# Patient Record
Sex: FEMALE | Race: ASIAN | NOT HISPANIC OR LATINO | Employment: FULL TIME | URBAN - METROPOLITAN AREA
[De-identification: names, ages, dates, MRNs, and addresses within clinical notes are randomized per-mention and may not be internally consistent; named-entity substitution may affect disease eponyms.]

---

## 2022-01-18 ENCOUNTER — TELEPHONE (OUTPATIENT)
Dept: GASTROENTEROLOGY | Facility: CLINIC | Age: 47
End: 2022-01-18

## 2022-01-18 ENCOUNTER — OFFICE VISIT (OUTPATIENT)
Dept: GASTROENTEROLOGY | Facility: CLINIC | Age: 47
End: 2022-01-18

## 2022-01-18 VITALS — BODY MASS INDEX: 19.09 KG/M2 | HEIGHT: 64 IN | WEIGHT: 111.8 LBS | TEMPERATURE: 97.8 F

## 2022-01-18 DIAGNOSIS — Z85.3 HISTORY OF BREAST CANCER: ICD-10-CM

## 2022-01-18 DIAGNOSIS — K58.0 IRRITABLE BOWEL SYNDROME WITH DIARRHEA: Primary | ICD-10-CM

## 2022-01-18 PROCEDURE — 99203 OFFICE O/P NEW LOW 30 MIN: CPT | Performed by: INTERNAL MEDICINE

## 2022-01-18 RX ORDER — ALOSETRON HYDROCHLORIDE 0.5 MG/1
0.5 TABLET, FILM COATED ORAL 2 TIMES DAILY
COMMUNITY
Start: 2021-09-22 | End: 2022-01-18 | Stop reason: SDUPTHER

## 2022-01-18 RX ORDER — MULTIPLE VITAMINS W/ MINERALS TAB 9MG-400MCG
1 TAB ORAL DAILY
COMMUNITY

## 2022-01-18 RX ORDER — ALOSETRON HYDROCHLORIDE 0.5 MG/1
0.5 TABLET, FILM COATED ORAL 2 TIMES DAILY
Qty: 180 TABLET | Refills: 3 | Status: SHIPPED | OUTPATIENT
Start: 2022-01-18 | End: 2023-03-28

## 2022-01-18 NOTE — PROGRESS NOTES
Chief Complaint   Patient presents with   • Establish Care   • Irritable Bowel Syndrome     Patti Hogue is a 46 y.o. female who presents with a history of IBS  HPI     Patient 46-year-old female with history of breast cancer, gestational diabetes and IBS diarrhea predominant presenting for establishing care.  Patient's last gastroenterologist in [facility] treated to good effect with Lotronex.  Patient reportedly started with Viberzi but unfortunately this did not improve her symptoms and found Lotronex subsequently.  Patient has done very well for the last 10 years on Lotronex but approximately 6 months ago started having weekly breakthrough episodes.  Patient using Imodium as needed which is helping her symptoms but is concerned with the new change.  Patient denies any nausea vomiting no melena or bright red blood per rectum.    Past Medical History:   Diagnosis Date   • Cancer (HCC)     breast cancer 2007 DCIS   • Gestational diabetes    • IBS (irritable bowel syndrome)    • Low blood pressure        Current Outpatient Medications:   •  alosetron (LOTRONEX) 0.5 MG tablet, Take 0.5 mg by mouth 2 (Two) Times a Day., Disp: , Rfl:   •  multivitamin with minerals (MULTIVITAMIN WOMEN PO), Take 1 tablet by mouth Daily., Disp: , Rfl:   Allergies   Allergen Reactions   • Penicillins Hives     Social History     Socioeconomic History   • Marital status:    Tobacco Use   • Smoking status: Passive Smoke Exposure - Never Smoker   • Smokeless tobacco: Never Used   • Tobacco comment: social in college    Substance and Sexual Activity   • Alcohol use: Yes     Comment: a glass of wine per night    • Drug use: Never     Family History   Family history unknown: Yes     Review of Systems   Constitutional: Negative.    HENT: Negative.    Eyes: Negative.    Respiratory: Negative.    Cardiovascular: Negative.    Gastrointestinal: Negative.    Endocrine: Negative.    Musculoskeletal: Negative.    Skin: Negative.     Allergic/Immunologic: Negative.    Hematological: Negative.      Vitals:    01/18/22 1526   Temp: 97.8 °F (36.6 °C)     Physical Exam  Vitals and nursing note reviewed.   Constitutional:       Appearance: Normal appearance. She is well-developed and normal weight.   HENT:      Head: Normocephalic and atraumatic.   Eyes:      General: No scleral icterus.     Pupils: Pupils are equal, round, and reactive to light.   Cardiovascular:      Rate and Rhythm: Normal rate and regular rhythm.      Heart sounds: Normal heart sounds. No murmur heard.  No friction rub. No gallop.    Pulmonary:      Effort: Pulmonary effort is normal.      Breath sounds: Normal breath sounds. No wheezing or rales.   Abdominal:      General: Bowel sounds are normal. There is no distension or abdominal bruit.      Palpations: Abdomen is soft. Abdomen is not rigid. There is no shifting dullness, fluid wave, mass or pulsatile mass.      Tenderness: There is no abdominal tenderness. There is no guarding.      Hernia: No hernia is present.   Musculoskeletal:         General: No swelling or tenderness. Normal range of motion.      Cervical back: Normal range of motion and neck supple. No rigidity.   Skin:     General: Skin is warm and dry.      Coloration: Skin is not jaundiced.      Findings: No rash.   Neurological:      General: No focal deficit present.      Mental Status: She is alert and oriented to person, place, and time.      Cranial Nerves: No cranial nerve deficit.   Psychiatric:         Behavior: Behavior normal.         Thought Content: Thought content normal.       Diagnoses and all orders for this visit:    Irritable bowel syndrome with diarrhea  -     Case Request; Standing  -     Case Request    Other orders  -     alosetron (LOTRONEX) 0.5 MG tablet; Take 0.5 mg by mouth 2 (Two) Times a Day.  -     multivitamin with minerals (MULTIVITAMIN WOMEN PO); Take 1 tablet by mouth Daily.    Patient 46-year-old female with 10-year history of  IBS failed Viberzi in the past has been doing well with Lotronex for the last 10 years.  Patient reportedly moved to [place] in February and did well for some time but over the last 6 months is started to have weekly breakthrough of her diarrhea despite the Lotronex.  Patient denies any bright red blood per rectum or melena has no family history of colorectal cancer or polyps.  At this point, because of the change in the stools and last colonoscopy more than 10 years ago would recommend repeating colonoscopy to evaluate for possible change.  We will continue the Lotronex as it is giving her relief though these weekly episodes require Imodium in addition.  Patient also requesting oncology recommendation as well as GYN because of her history of breast cancer.  Referrals were placed for those as well.

## 2022-01-18 NOTE — TELEPHONE ENCOUNTER
gabrielle Colón for 03/21 arrive at 1200/cs- gave pt procedure packet in offfice on 1/18, advised pt time is subject to change [facility] will call.

## 2022-02-23 NOTE — PROGRESS NOTES
Subjective Patient transferring care upon moved to [facility]    REASON FOR CONSULTATION: History of DCIS left breast 2007  Provide an opinion on any further workup or treatment                             REQUESTING PHYSICIAN:      RECORDS OBTAINED:  Records of the patients history including those obtained from the referring provider were reviewed and summarized in detail.    HISTORY OF PRESENT ILLNESS:  The patient is a 46 y.o. year old female who is here for an opinion about the above issue.    History of Present Illness   The patient is now a 46-year-old female seen in January 2022 by GI medicine presenting to [facility].  She has a known history of gestational diabetes, IBS diarrhea and been treated with Lotronex successfully.  She was seen by Dr. Blanco 1/18/2022 having moved to [place] in February of this year and with a history of IBS initially treated with Viberzi which was unsuccessful and the patient, thereafter, was treated successfully with Lotronex.  Plans were made for subsequent colonoscopy and, additionally, oncology recommendation was requested as result of a previous history of ductal carcinoma in situ.     Her records concerning this included review by Dr. Deepika Tejada of [facility] on 2/12/2020 with a history of DCIS (ER positive) status post lumpectomy and XRT in 2007 followed by tamoxifen x3 years discontinued 2010.  The patient describes genetic assessment for likely BRCA in 2007 which was negative.  The patient's been followed by yearly mammography and possibly every third year MRI.  She has been clinically stable without recurrence undergoing genetic assessment which was evidently unremarkable and also reviewed and treated for iron deficiency anemia.  Patient studies date from 2/12/2020 with ferritin 29.5, B12 906.5, vitamin D of 30.7, CMP with BUN/creatinine of 13.5 and 0.67, calcium 9.6, normal LFTs, iron of 115, TIBC of 3 1736%  saturation, LDH of 142  The patient is initially seen 2/24/2022 at [facility] office and we discussed her history as well as periodic symptoms of left upper extremity tremor, recent ParaGard placement in 2018 for irregular menses, associated iron deficiency and need for additional genetic assessment with extended panel.      Past Medical History:   Diagnosis Date   • Cancer (HCC)     breast cancer 2007 DCIS   • Gestational diabetes    • IBS (irritable bowel syndrome)    • Low blood pressure         Past Surgical History:   Procedure Laterality Date   • BREAST LUMPECTOMY  2007   • COLONOSCOPY  approx 2011    negative per pt    • TUMOR REMOVAL      right foot during childhood        Current Outpatient Medications on File Prior to Visit   Medication Sig Dispense Refill   • alosetron (LOTRONEX) 0.5 MG tablet Take 1 tablet by mouth 2 (Two) Times a Day. 180 tablet 3   • multivitamin with minerals (MULTIVITAMIN WOMEN PO) Take 1 tablet by mouth Daily.       No current facility-administered medications on file prior to visit.        ALLERGIES:    Allergies   Allergen Reactions   • Penicillins Hives        Social History     Socioeconomic History   • Marital status:      Spouse name: Max   • Years of education: College   Tobacco Use   • Smoking status: Passive Smoke Exposure - Never Smoker   • Smokeless tobacco: Never Used   • Tobacco comment: social in college    Substance and Sexual Activity   • Alcohol use: Yes     Comment: a glass of wine per night    • Drug use: Never        Family History   Family history unknown: Yes        Review of Systems   Constitutional: Negative.    HENT: Negative.    Eyes: Negative.    Respiratory: Negative.    Cardiovascular: Negative.    Gastrointestinal: Positive for diarrhea (See history of present illness).   Endocrine: Negative.    Genitourinary: Negative.    Musculoskeletal: Negative.    Skin: Negative.    Allergic/Immunologic: Negative.    Neurological: Positive for tremors (Left upper  "extremity, episodic).   Hematological: Negative.    Psychiatric/Behavioral: Negative.        Objective     Vitals:    02/24/22 1024   BP: 134/70   Pulse: 61   Resp: 16   Temp: 97.3 °F (36.3 °C)   TempSrc: Temporal   SpO2: 100%   Weight: 50 kg (110 lb 4.8 oz)   Height: 162.6 cm (64\")   PainSc: 0-No pain     Current Status 2/24/2022   ECOG score 0       Physical Exam  Constitutional:       Appearance: Normal appearance. She is normal weight.   HENT:      Head: Normocephalic and atraumatic.      Nose: Nose normal.      Mouth/Throat:      Mouth: Mucous membranes are moist.      Pharynx: Oropharynx is clear.   Eyes:      Extraocular Movements: Extraocular movements intact.      Conjunctiva/sclera: Conjunctivae normal.      Pupils: Pupils are equal, round, and reactive to light.   Cardiovascular:      Rate and Rhythm: Normal rate and regular rhythm.      Pulses: Normal pulses.      Heart sounds: Normal heart sounds.   Pulmonary:      Effort: Pulmonary effort is normal.      Breath sounds: Normal breath sounds.      Comments: Patient status post left breast lumpectomy, well-healed, radiation tattoos recognized, no additional abnormalities  Abdominal:      General: Bowel sounds are normal.      Palpations: Abdomen is soft.   Musculoskeletal:         General: Normal range of motion.      Cervical back: Normal range of motion and neck supple.   Skin:     General: Skin is warm and dry.   Neurological:      General: No focal deficit present.      Mental Status: She is alert and oriented to person, place, and time.   Psychiatric:         Mood and Affect: Mood normal.         Behavior: Behavior normal.           RECENT LABS:  Hematology WBC   Date Value Ref Range Status   02/24/2022 4.64 3.40 - 10.80 10*3/mm3 Final     RBC   Date Value Ref Range Status   02/24/2022 3.96 3.77 - 5.28 10*6/mm3 Final     Hemoglobin   Date Value Ref Range Status   02/24/2022 12.5 12.0 - 15.9 g/dL Final     Hematocrit   Date Value Ref Range Status "   02/24/2022 36.2 34.0 - 46.6 % Final     Platelets   Date Value Ref Range Status   02/24/2022 188 140 - 450 10*3/mm3 Final          Assessment/Plan     46-year-old female with history of:    *DCIS left breast 2007 status post lumpectomy, radiation therapy and 3 years of tamoxifen.  Patient describes the tumor was ER positive but is unclear about additional testing.  · The patient will require follow-up yearly mammography and ultrasound per report 2/11/2020.  We will contact her and try to update the schedule.  · We have discussed further genetic testing-additional breast panel with referral to genetics planned  · Return to laboratory today for baseline CMP, ferritin iron profile  · Patient requests gynecologic referral  · Follow-up CBC office 8 weeks

## 2022-02-24 ENCOUNTER — CONSULT (OUTPATIENT)
Dept: ONCOLOGY | Facility: CLINIC | Age: 47
End: 2022-02-24

## 2022-02-24 ENCOUNTER — LAB (OUTPATIENT)
Dept: LAB | Facility: HOSPITAL | Age: 47
End: 2022-02-24

## 2022-02-24 VITALS
OXYGEN SATURATION: 100 % | HEART RATE: 61 BPM | HEIGHT: 64 IN | SYSTOLIC BLOOD PRESSURE: 134 MMHG | DIASTOLIC BLOOD PRESSURE: 70 MMHG | RESPIRATION RATE: 16 BRPM | TEMPERATURE: 97.3 F | WEIGHT: 110.3 LBS | BODY MASS INDEX: 18.83 KG/M2

## 2022-02-24 DIAGNOSIS — D05.12 BREAST NEOPLASM, TIS (DCIS), LEFT: Primary | ICD-10-CM

## 2022-02-24 DIAGNOSIS — Z85.3 HISTORY OF BREAST CANCER: Primary | ICD-10-CM

## 2022-02-24 DIAGNOSIS — Z86.000 HISTORY OF DUCTAL CARCINOMA IN SITU (DCIS) OF BREAST: ICD-10-CM

## 2022-02-24 LAB
ALBUMIN SERPL-MCNC: 4.6 G/DL (ref 3.5–5.2)
ALBUMIN/GLOB SERPL: 2 G/DL (ref 1.1–2.4)
ALP SERPL-CCNC: 36 U/L (ref 38–116)
ALT SERPL W P-5'-P-CCNC: 11 U/L (ref 0–33)
ANION GAP SERPL CALCULATED.3IONS-SCNC: 10.2 MMOL/L (ref 5–15)
AST SERPL-CCNC: 14 U/L (ref 0–32)
BASOPHILS # BLD AUTO: 0.02 10*3/MM3 (ref 0–0.2)
BASOPHILS NFR BLD AUTO: 0.4 % (ref 0–1.5)
BILIRUB SERPL-MCNC: 0.5 MG/DL (ref 0.2–1.2)
BUN SERPL-MCNC: 14 MG/DL (ref 6–20)
BUN/CREAT SERPL: 18.9 (ref 7.3–30)
CALCIUM SPEC-SCNC: 8.9 MG/DL (ref 8.5–10.2)
CHLORIDE SERPL-SCNC: 103 MMOL/L (ref 98–107)
CO2 SERPL-SCNC: 27.8 MMOL/L (ref 22–29)
CREAT SERPL-MCNC: 0.74 MG/DL (ref 0.6–1.1)
DEPRECATED RDW RBC AUTO: 44.6 FL (ref 37–54)
EOSINOPHIL # BLD AUTO: 0.06 10*3/MM3 (ref 0–0.4)
EOSINOPHIL NFR BLD AUTO: 1.3 % (ref 0.3–6.2)
ERYTHROCYTE [DISTWIDTH] IN BLOOD BY AUTOMATED COUNT: 13.2 % (ref 12.3–15.4)
FERRITIN SERPL-MCNC: 13.6 NG/ML (ref 11–207)
GFR SERPL CREATININE-BSD FRML MDRD: 103 ML/MIN/1.73
GFR SERPL CREATININE-BSD FRML MDRD: 85 ML/MIN/1.73
GLOBULIN UR ELPH-MCNC: 2.3 GM/DL (ref 1.8–3.5)
GLUCOSE SERPL-MCNC: 111 MG/DL (ref 74–124)
HCT VFR BLD AUTO: 36.2 % (ref 34–46.6)
HGB BLD-MCNC: 12.5 G/DL (ref 12–15.9)
IMM GRANULOCYTES # BLD AUTO: 0.11 10*3/MM3 (ref 0–0.05)
IMM GRANULOCYTES NFR BLD AUTO: 2.4 % (ref 0–0.5)
IRON 24H UR-MRATE: 45 MCG/DL (ref 37–145)
IRON SATN MFR SERPL: 10 % (ref 14–48)
LYMPHOCYTES # BLD AUTO: 1.33 10*3/MM3 (ref 0.7–3.1)
LYMPHOCYTES NFR BLD AUTO: 28.7 % (ref 19.6–45.3)
MCH RBC QN AUTO: 31.6 PG (ref 26.6–33)
MCHC RBC AUTO-ENTMCNC: 34.5 G/DL (ref 31.5–35.7)
MCV RBC AUTO: 91.4 FL (ref 79–97)
MONOCYTES # BLD AUTO: 0.29 10*3/MM3 (ref 0.1–0.9)
MONOCYTES NFR BLD AUTO: 6.3 % (ref 5–12)
NEUTROPHILS NFR BLD AUTO: 2.83 10*3/MM3 (ref 1.7–7)
NEUTROPHILS NFR BLD AUTO: 60.9 % (ref 42.7–76)
NRBC BLD AUTO-RTO: 0 /100 WBC (ref 0–0.2)
PLATELET # BLD AUTO: 188 10*3/MM3 (ref 140–450)
PMV BLD AUTO: 10.7 FL (ref 6–12)
POTASSIUM SERPL-SCNC: 3.8 MMOL/L (ref 3.5–4.7)
PROT SERPL-MCNC: 6.9 G/DL (ref 6.3–8)
RBC # BLD AUTO: 3.96 10*6/MM3 (ref 3.77–5.28)
SODIUM SERPL-SCNC: 141 MMOL/L (ref 134–145)
TIBC SERPL-MCNC: 449 MCG/DL (ref 249–505)
TRANSFERRIN SERPL-MCNC: 321 MG/DL (ref 200–360)
WBC NRBC COR # BLD: 4.64 10*3/MM3 (ref 3.4–10.8)

## 2022-02-24 PROCEDURE — 99204 OFFICE O/P NEW MOD 45 MIN: CPT | Performed by: INTERNAL MEDICINE

## 2022-02-24 PROCEDURE — 83540 ASSAY OF IRON: CPT | Performed by: INTERNAL MEDICINE

## 2022-02-24 PROCEDURE — 84466 ASSAY OF TRANSFERRIN: CPT | Performed by: INTERNAL MEDICINE

## 2022-02-24 PROCEDURE — 82728 ASSAY OF FERRITIN: CPT | Performed by: INTERNAL MEDICINE

## 2022-02-24 PROCEDURE — 85025 COMPLETE CBC W/AUTO DIFF WBC: CPT

## 2022-02-24 PROCEDURE — 80053 COMPREHEN METABOLIC PANEL: CPT | Performed by: INTERNAL MEDICINE

## 2022-02-24 PROCEDURE — 36415 COLL VENOUS BLD VENIPUNCTURE: CPT

## 2022-02-25 ENCOUNTER — TELEPHONE (OUTPATIENT)
Dept: ONCOLOGY | Facility: CLINIC | Age: 47
End: 2022-02-25

## 2022-02-25 NOTE — TELEPHONE ENCOUNTER
Attempted to call pt both yesterday and today. Per Dr. Calderón, he would like for pt to have a screening mammogram done and would also like to inform her that her iron is low and she should start OTC iron daily. Attempted to call pt x 3 and left 2 VMs. Awaiting callback.

## 2022-02-28 ENCOUNTER — TELEPHONE (OUTPATIENT)
Dept: ONCOLOGY | Facility: CLINIC | Age: 47
End: 2022-02-28

## 2022-02-28 ENCOUNTER — TRANSCRIBE ORDERS (OUTPATIENT)
Dept: ADMINISTRATIVE | Facility: HOSPITAL | Age: 47
End: 2022-02-28

## 2022-02-28 DIAGNOSIS — Z86.000 HISTORY OF CARCINOMA IN SITU OF BREAST: Primary | ICD-10-CM

## 2022-02-28 DIAGNOSIS — Z86.000 HISTORY OF DUCTAL CARCINOMA IN SITU (DCIS) OF BREAST: Primary | ICD-10-CM

## 2022-02-28 DIAGNOSIS — Z86.000 PERSONAL HISTORY OF IN-SITU NEOPLASM OF BREAST: ICD-10-CM

## 2022-02-28 RX ORDER — FERROUS SULFATE 325(65) MG
325 TABLET ORAL
Qty: 30 TABLET | Refills: 5 | Status: SHIPPED | OUTPATIENT
Start: 2022-02-28 | End: 2022-09-01

## 2022-02-28 NOTE — TELEPHONE ENCOUNTER
Pt returned call Friday after hours and left voicemail. Returned call and no answer. LVM explaining that Dr. Calderón feels she needs a bilateral screening mammogram and US and needs to start on oral iron as her iron levels are very low. Advised her to return call to let RN know she has received the message. Escribed ferrous sulfate to pharmacy, order placed and message sent to scheduling.

## 2022-03-03 ENCOUNTER — TELEPHONE (OUTPATIENT)
Dept: ONCOLOGY | Facility: CLINIC | Age: 47
End: 2022-03-03

## 2022-03-03 NOTE — TELEPHONE ENCOUNTER
Pt returned call. She wanted to clarify oral iron instructions. Advised her Dr. Calderón wants her to start on ferrous sulfate 325mg daily. Pt states she has IBS and is concerned about taking oral iron. Advised her if she cannot tolerate, she needs to call us and we can talk about switching her to IV iron. She v/u.

## 2022-03-21 ENCOUNTER — ANESTHESIA (OUTPATIENT)
Dept: GASTROENTEROLOGY | Facility: HOSPITAL | Age: 47
End: 2022-03-21

## 2022-03-21 ENCOUNTER — HOSPITAL ENCOUNTER (OUTPATIENT)
Facility: HOSPITAL | Age: 47
Setting detail: HOSPITAL OUTPATIENT SURGERY
Discharge: HOME | End: 2022-03-21
Attending: INTERNAL MEDICINE | Admitting: INTERNAL MEDICINE

## 2022-03-21 ENCOUNTER — ANESTHESIA EVENT (OUTPATIENT)
Dept: GASTROENTEROLOGY | Facility: HOSPITAL | Age: 47
End: 2022-03-21

## 2022-03-21 VITALS
BODY MASS INDEX: 18.44 KG/M2 | HEART RATE: 70 BPM | HEIGHT: 64 IN | DIASTOLIC BLOOD PRESSURE: 68 MMHG | OXYGEN SATURATION: 100 % | WEIGHT: 108 LBS | SYSTOLIC BLOOD PRESSURE: 124 MMHG | RESPIRATION RATE: 16 BRPM

## 2022-03-21 DIAGNOSIS — K58.0 IRRITABLE BOWEL SYNDROME WITH DIARRHEA: ICD-10-CM

## 2022-03-21 LAB
B-HCG UR QL: NEGATIVE
EXPIRATION DATE: NORMAL
INTERNAL NEGATIVE CONTROL: NORMAL
INTERNAL POSITIVE CONTROL: NORMAL
Lab: NORMAL

## 2022-03-21 PROCEDURE — 88305 TISSUE EXAM BY PATHOLOGIST: CPT | Performed by: INTERNAL MEDICINE

## 2022-03-21 PROCEDURE — 45380 COLONOSCOPY AND BIOPSY: CPT | Performed by: INTERNAL MEDICINE

## 2022-03-21 PROCEDURE — 25010000002 PROPOFOL 10 MG/ML EMULSION: Performed by: NURSE ANESTHETIST, CERTIFIED REGISTERED

## 2022-03-21 PROCEDURE — S0260 H&P FOR SURGERY: HCPCS | Performed by: INTERNAL MEDICINE

## 2022-03-21 PROCEDURE — 81025 URINE PREGNANCY TEST: CPT | Performed by: INTERNAL MEDICINE

## 2022-03-21 RX ORDER — PROPOFOL 10 MG/ML
VIAL (ML) INTRAVENOUS CONTINUOUS PRN
Status: DISCONTINUED | OUTPATIENT
Start: 2022-03-21 | End: 2022-03-21 | Stop reason: SURG

## 2022-03-21 RX ORDER — SODIUM CHLORIDE 0.9 % (FLUSH) 0.9 %
10 SYRINGE (ML) INJECTION AS NEEDED
Status: DISCONTINUED | OUTPATIENT
Start: 2022-03-21 | End: 2022-03-21 | Stop reason: HOSPADM

## 2022-03-21 RX ORDER — LIDOCAINE HYDROCHLORIDE 20 MG/ML
INJECTION, SOLUTION INFILTRATION; PERINEURAL AS NEEDED
Status: DISCONTINUED | OUTPATIENT
Start: 2022-03-21 | End: 2022-03-21 | Stop reason: SURG

## 2022-03-21 RX ORDER — SODIUM CHLORIDE, SODIUM LACTATE, POTASSIUM CHLORIDE, CALCIUM CHLORIDE 600; 310; 30; 20 MG/100ML; MG/100ML; MG/100ML; MG/100ML
1000 INJECTION, SOLUTION INTRAVENOUS CONTINUOUS
Status: DISCONTINUED | OUTPATIENT
Start: 2022-03-21 | End: 2022-03-21 | Stop reason: HOSPADM

## 2022-03-21 RX ORDER — LIDOCAINE HYDROCHLORIDE 10 MG/ML
0.5 INJECTION, SOLUTION INFILTRATION; PERINEURAL ONCE AS NEEDED
Status: DISCONTINUED | OUTPATIENT
Start: 2022-03-21 | End: 2022-03-21 | Stop reason: HOSPADM

## 2022-03-21 RX ADMIN — LIDOCAINE HYDROCHLORIDE 60 MG: 20 INJECTION, SOLUTION INFILTRATION; PERINEURAL at 13:12

## 2022-03-21 RX ADMIN — PROPOFOL 180 MCG/KG/MIN: 10 INJECTION, EMULSION INTRAVENOUS at 13:12

## 2022-03-21 RX ADMIN — SODIUM CHLORIDE, POTASSIUM CHLORIDE, SODIUM LACTATE AND CALCIUM CHLORIDE 1000 ML: 600; 310; 30; 20 INJECTION, SOLUTION INTRAVENOUS at 12:52

## 2022-03-21 NOTE — ANESTHESIA PREPROCEDURE EVALUATION
Anesthesia Evaluation     Patient summary reviewed and Nursing notes reviewed   NPO Solid Status: > 8 hours  NPO Liquid Status: > 2 hours           Airway   Mallampati: I  Dental - normal exam     Pulmonary - negative pulmonary ROS and normal exam   Cardiovascular - negative cardio ROS and normal exam        Neuro/Psych- negative ROS  GI/Hepatic/Renal/Endo    (+)   diabetes mellitus type 2,     Musculoskeletal (-) negative ROS    Abdominal    Substance History - negative use     OB/GYN          Other      history of cancer                    Anesthesia Plan    ASA 2     MAC       Anesthetic plan, all risks, benefits, and alternatives have been provided, discussed and informed consent has been obtained with: patient.    Plan discussed with CRNA.        CODE STATUS:

## 2022-03-21 NOTE — DISCHARGE INSTRUCTIONS
For the next 24 hours patient needs to be with a responsible adult.    For 24 hours DO NOT drive, operate machinery, appliances, drink alcohol, make important decisions or sign legal documents.    Start with a light or bland diet if you are feeling sick to your stomach otherwise advance to regular diet as tolerated.    Follow recommendations on procedure report if provided by your doctor.    Call Dr Blanco for problems [phone]    Problems may include but not limited to: large amounts of bleeding, trouble breathing, repeated vomiting, severe unrelieved pain, fever or chills.

## 2022-03-21 NOTE — H&P
"[facility]  Pre Procedure History & Physical    Chief Complaint:   Diarrhea    Subjective     HPI:   Patient 46-year-old female with history of breast cancer, gestational diabetes and IBS with diarrhea had been doing well for 10 years on Lotronex now having breakthrough diarrhea here for colonoscopy.    Past Medical History:   Past Medical History:   Diagnosis Date   • Cancer (HCC)     breast cancer 2007 DCIS   • Gestational diabetes    • IBS (irritable bowel syndrome)    • Low blood pressure        Past Surgical History:  Past Surgical History:   Procedure Laterality Date   • BREAST LUMPECTOMY  2007   • COLONOSCOPY  approx 2011    negative per pt    • TUMOR REMOVAL      right foot during childhood       Family History:  Family History   Adopted: Yes   Problem Relation Age of Onset   • Malig Hyperthermia Neg Hx        Social History:   reports that she is a non-smoker but has been exposed to tobacco smoke. She has been exposed to tobacco smoke for the past 2.00 years. She has never used smokeless tobacco. She reports current alcohol use. She reports that she does not use drugs.    Medications:   Medications Prior to Admission   Medication Sig Dispense Refill Last Dose   • alosetron (LOTRONEX) 0.5 MG tablet Take 1 tablet by mouth 2 (Two) Times a Day. 180 tablet 3 3/20/2022 at 0900   • ferrous sulfate 325 (65 FE) MG tablet Take 1 tablet by mouth Daily With Breakfast. 30 tablet 5 3/19/2022 at 0900   • multivitamin with minerals tablet tablet Take 1 tablet by mouth Daily.   3/20/2022 at 0900       Allergies:  Penicillins    ROS:    Pertinent items are noted in HPI     Objective     Blood pressure 128/78, pulse 78, resp. rate 16, height 162.6 cm (64\"), weight 49 kg (108 lb), SpO2 99 %.    Physical Exam   Constitutional: Pt is oriented to person, place, and time and well-developed, well-nourished, and in no distress.   Mouth/Throat: Oropharynx is clear and moist.   Neck: Normal range of motion. "   Cardiovascular: Normal rate, regular rhythm and normal heart sounds.    Pulmonary/Chest: Effort normal and breath sounds normal.   Abdominal: Soft. Nontender  Skin: Skin is warm and dry.   Psychiatric: Mood, memory, affect and judgment normal.     Assessment/Plan     Diagnosis:  Recurrent diarrhea    Anticipated Surgical Procedure:  Colonoscopy    The risks, benefits, and alternatives of this procedure have been discussed with the patient or the responsible party- the patient understands and agrees to proceed.

## 2022-03-21 NOTE — ANESTHESIA POSTPROCEDURE EVALUATION
"Patient: Patti Hogue    Procedure Summary     Date: 03/21/22 Room / Location:  [facility]    Anesthesia Start: 1305 Anesthesia Stop: 1330    Procedure: COLONOSCOPY to cecum and TI with biopsies (N/A ) Diagnosis:       Irritable bowel syndrome with diarrhea      Internal hemorrhoids      (Irritable bowel syndrome with diarrhea [K58.0])    Surgeons: Tuan Blanco MD Provider: Corina Casey MD    Anesthesia Type: MAC ASA Status: 2          Anesthesia Type: MAC    Vitals  Vitals Value Taken Time   /79 03/21/22 1342   Temp     Pulse 66 03/21/22 1342   Resp 16 03/21/22 1342   SpO2 100 % 03/21/22 1342           Post Anesthesia Care and Evaluation    Patient location during evaluation: PHASE II  Patient participation: complete - patient participated  Level of consciousness: awake  Pain management: adequate  Airway patency: patent  Anesthetic complications: No anesthetic complications    Cardiovascular status: acceptable  Respiratory status: acceptable  Hydration status: acceptable    Comments: /68 (BP Location: Left arm, Patient Position: Lying)   Pulse 70   Resp 16   Ht 162.6 cm (64\")   Wt 49 kg (108 lb)   LMP  (LMP Unknown)   SpO2 100%   BMI 18.54 kg/m²       "

## 2022-03-22 LAB
LAB AP CASE REPORT: NORMAL
LAB AP CLINICAL INFORMATION: NORMAL
PATH REPORT.FINAL DX SPEC: NORMAL
PATH REPORT.GROSS SPEC: NORMAL

## 2022-03-28 ENCOUNTER — HOSPITAL ENCOUNTER (OUTPATIENT)
Dept: MAMMOGRAPHY | Facility: HOSPITAL | Age: 47
Discharge: HOME | End: 2022-03-28

## 2022-03-28 ENCOUNTER — HOSPITAL ENCOUNTER (OUTPATIENT)
Dept: ULTRASOUND IMAGING | Facility: HOSPITAL | Age: 47
Discharge: HOME | End: 2022-03-28

## 2022-03-28 DIAGNOSIS — Z86.000 HISTORY OF CARCINOMA IN SITU OF BREAST: ICD-10-CM

## 2022-03-28 DIAGNOSIS — Z86.000 HISTORY OF DUCTAL CARCINOMA IN SITU (DCIS) OF BREAST: ICD-10-CM

## 2022-03-28 PROCEDURE — 76641 ULTRASOUND BREAST COMPLETE: CPT

## 2022-03-28 PROCEDURE — 77066 DX MAMMO INCL CAD BI: CPT

## 2022-03-28 PROCEDURE — G0279 TOMOSYNTHESIS, MAMMO: HCPCS

## 2022-04-04 ENCOUNTER — TELEPHONE (OUTPATIENT)
Dept: GASTROENTEROLOGY | Facility: CLINIC | Age: 47
End: 2022-04-04

## 2022-04-04 NOTE — TELEPHONE ENCOUNTER
----- Message from Tuan Blanco MD sent at 4/3/2022  2:37 PM EDT -----  Biopsies negative, give trial xifaxan 550 tid for 14 days with 2 refills.  Pt to call if not improved

## 2022-04-05 ENCOUNTER — TELEPHONE (OUTPATIENT)
Dept: GASTROENTEROLOGY | Facility: CLINIC | Age: 47
End: 2022-04-05

## 2022-04-08 ENCOUNTER — TELEPHONE (OUTPATIENT)
Dept: GASTROENTEROLOGY | Facility: CLINIC | Age: 47
End: 2022-04-08

## 2022-04-08 RX ORDER — NORTRIPTYLINE HYDROCHLORIDE 10 MG/1
10 CAPSULE ORAL NIGHTLY
Qty: 30 CAPSULE | Refills: 11 | Status: SHIPPED | OUTPATIENT
Start: 2022-04-08 | End: 2023-04-03

## 2022-04-14 ENCOUNTER — APPOINTMENT (OUTPATIENT)
Dept: LAB | Facility: HOSPITAL | Age: 47
End: 2022-04-14

## 2022-04-14 ENCOUNTER — CLINICAL SUPPORT (OUTPATIENT)
Dept: OTHER | Facility: HOSPITAL | Age: 47
End: 2022-04-14

## 2022-04-14 DIAGNOSIS — Z13.79 GENETIC TESTING: Primary | ICD-10-CM

## 2022-04-14 DIAGNOSIS — Z86.000 HISTORY OF DUCTAL CARCINOMA IN SITU (DCIS) OF BREAST: ICD-10-CM

## 2022-04-14 PROCEDURE — 96040: CPT | Performed by: GENETIC COUNSELOR, MS

## 2022-04-14 NOTE — PROGRESS NOTES
Patti Hogue, a 46-year-old female, was referred for genetic counseling due to a personal history of breast cancer. Ms. Hogue was diagnosed with a left-sided DCIS in 2007. She was treated with a lumpectomy and was on Tamoxifen from 2007 to 2010. She reports having negative testing for BRCA1/2 at that time. She is pre-menopausal and retains her uterus and ovaries. Ms. Hogue’s most recent mammogram was in March 2022 and normal. Ms. Hogue had a colonoscopy in March 2022 that was normal.  She was interested in discussing her risk for a hereditary cancer syndrome.   Ms. Hogue decided to pursue comprehensive genetic testing to evaluate her risk of cancer, therefore the CancerNext Panel was ordered through [employer] which analyzes 36 genes associated with an increased cancer risk. Results are expected in 2-3 weeks.      PERTINENT FAMILY HISTORY: (See attached pedigree)     Ms. Hogue was adopted and has no information on her biological family.     RISK ASSESSMENT:  Ms. Hogue’s personal history of breast cancer raises the question of a hereditary cancer syndrome.  NCCN guidelines for genetic testing for BRCA1/2 states that individuals with breast cancer under the age of 45 may consider genetic testing. With Ms. Hogue’s diagnosis being at age 31, she would clearly meet this criteria. This risk assessment is based on the family history information provided at the time of the appointment.  The assessment could change in the future should new information be obtained.    GENETIC COUNSELING (30 minutes):  We reviewed the family history information in detail. Cases of cancer follow three general patterns: sporadic, familial, and hereditary.  While most cancer is sporadic, some cases appear to occur in family clusters.  These cases are said to be familial and account for 10-20% of cancer cases.  Familial cases may be due to a combination of shared genes and environmental factors among family members.  In even fewer cases, the  risk for cancer is inherited, and the genes responsible for the increased cancer risk are known.       Family histories typical of hereditary cancer syndromes usually include multiple first- and second-degree relatives diagnosed with cancer types that define a syndrome.  These cases tend to be diagnosed at younger-than-expected ages and can be bilateral or multifocal.  The cancer in these families follows an autosomal dominant inheritance pattern, which indicates the likely presence of a mutation in a cancer susceptibility gene.  Children and siblings of an individual believed to carry this mutation have a 50% chance of inheriting that mutation, thereby inheriting the increased risk to develop cancer.  These mutations can be passed down from the maternal or the paternal lineage.     Due to Ms. Hogue’s personal history of breast cancer, we discussed hereditary breast cancer. Hereditary breast cancer accounts for 5-10% of all cases of breast cancer.  A significant proportion (50%) of hereditary breast cancer can be attributed to mutations in the BRCA1 and BRCA2 genes.  Mutations in these genes confer an increased risk for breast cancer, ovarian cancer, male breast cancer, prostate cancer, and pancreatic cancer.  Women with a BRCA1 or BRCA2 mutation have up to an 87% lifetime risk of breast cancer and up to a 44% risk of ovarian cancer.  There are other clinically significant breast cancer related genes in addition to BRCA1/2, including PALB2, DEREJE, and CHEK2.     There are other hereditary cancer syndromes as well. Since Ms. Sesay has no information on her biological family history and her desire to get more information regarding her personal risks, testing was pursued through a multigene panel evaluating several other genes known to increase the risk for cancer.       GENETIC TESTING:  The risks, benefits, and limitations of genetic testing and implications for clinical management following testing were reviewed.   DNA test results can influence decisions regarding screening and prevention.  Genetic testing can have significant psychological implications for both individuals and families. Also discussed was the possibility of employment and insurance discrimination based on genetic test results and the laws in place to prevent this, as well as the limitations of these laws.       We discussed panel testing, which would involve testing 36genes associated with increased cancer risk. The implications of a positive or negative test result were discussed. In general, a negative genetic test result is most informative if a mutation has first been established in an affected member of the family. We discussed the possibility that, in some cases, genetic test results may be ambiguous due to the identification of a genetic variant. These variants may or may not be associated with an increased cancer risk. With multigene panel testing, it is not uncommon for a variant of uncertain significance (VUS) to be identified.  If a VUS is identified, testing family members is typically not recommended and screening recommendations are made based on the family history.  The laboratories that perform genetic testing work to reclassify the VUS and send out an amended report if and when a VUS is reclassified.  The majority of variant findings are ultimately reclassified to a negative result. Given Ms. Hogue’s unknown family history, a negative test result does not eliminate all cancer risk, although the risk would not be as high as it would with positive genetic testing.     PLAN: Genetic testing was ordered via the CancerNext Panel through [facility]. Results are expected in 2-3 weeks and will be called out to Ms. Hogue. If she has any questions in the meantime, she is welcome to call me at [phone].      Jasmyn Rossi MS, [facility], [facility]  Licensed Certified Genetic Counselor

## 2022-04-19 DIAGNOSIS — Z86.000 HISTORY OF DUCTAL CARCINOMA IN SITU (DCIS) OF BREAST: Primary | ICD-10-CM

## 2022-04-21 ENCOUNTER — LAB (OUTPATIENT)
Dept: LAB | Facility: HOSPITAL | Age: 47
End: 2022-04-21

## 2022-04-21 ENCOUNTER — OFFICE VISIT (OUTPATIENT)
Dept: ONCOLOGY | Facility: CLINIC | Age: 47
End: 2022-04-21

## 2022-04-21 VITALS
HEIGHT: 64 IN | BODY MASS INDEX: 18.85 KG/M2 | TEMPERATURE: 98 F | DIASTOLIC BLOOD PRESSURE: 77 MMHG | OXYGEN SATURATION: 100 % | HEART RATE: 78 BPM | WEIGHT: 110.4 LBS | SYSTOLIC BLOOD PRESSURE: 116 MMHG | RESPIRATION RATE: 16 BRPM

## 2022-04-21 DIAGNOSIS — D50.0 IRON DEFICIENCY ANEMIA DUE TO CHRONIC BLOOD LOSS: Primary | ICD-10-CM

## 2022-04-21 DIAGNOSIS — Z86.000 HISTORY OF DUCTAL CARCINOMA IN SITU (DCIS) OF BREAST: ICD-10-CM

## 2022-04-21 LAB
BASOPHILS # BLD AUTO: 0.04 10*3/MM3 (ref 0–0.2)
BASOPHILS NFR BLD AUTO: 0.7 % (ref 0–1.5)
DEPRECATED RDW RBC AUTO: 48.9 FL (ref 37–54)
EOSINOPHIL # BLD AUTO: 0.04 10*3/MM3 (ref 0–0.4)
EOSINOPHIL NFR BLD AUTO: 0.7 % (ref 0.3–6.2)
ERYTHROCYTE [DISTWIDTH] IN BLOOD BY AUTOMATED COUNT: 14.1 % (ref 12.3–15.4)
HCT VFR BLD AUTO: 36 % (ref 34–46.6)
HGB BLD-MCNC: 12.2 G/DL (ref 12–15.9)
IMM GRANULOCYTES # BLD AUTO: 0.02 10*3/MM3 (ref 0–0.05)
IMM GRANULOCYTES NFR BLD AUTO: 0.4 % (ref 0–0.5)
LYMPHOCYTES # BLD AUTO: 1.34 10*3/MM3 (ref 0.7–3.1)
LYMPHOCYTES NFR BLD AUTO: 24.5 % (ref 19.6–45.3)
MCH RBC QN AUTO: 31.9 PG (ref 26.6–33)
MCHC RBC AUTO-ENTMCNC: 33.9 G/DL (ref 31.5–35.7)
MCV RBC AUTO: 94 FL (ref 79–97)
MONOCYTES # BLD AUTO: 0.37 10*3/MM3 (ref 0.1–0.9)
MONOCYTES NFR BLD AUTO: 6.8 % (ref 5–12)
NEUTROPHILS NFR BLD AUTO: 3.67 10*3/MM3 (ref 1.7–7)
NEUTROPHILS NFR BLD AUTO: 66.9 % (ref 42.7–76)
NRBC BLD AUTO-RTO: 0 /100 WBC (ref 0–0.2)
PLATELET # BLD AUTO: 222 10*3/MM3 (ref 140–450)
PMV BLD AUTO: 9.6 FL (ref 6–12)
RBC # BLD AUTO: 3.83 10*6/MM3 (ref 3.77–5.28)
WBC NRBC COR # BLD: 5.48 10*3/MM3 (ref 3.4–10.8)

## 2022-04-21 PROCEDURE — 99214 OFFICE O/P EST MOD 30 MIN: CPT | Performed by: INTERNAL MEDICINE

## 2022-04-21 PROCEDURE — 85025 COMPLETE CBC W/AUTO DIFF WBC: CPT

## 2022-04-21 PROCEDURE — 36415 COLL VENOUS BLD VENIPUNCTURE: CPT

## 2022-04-21 NOTE — PROGRESS NOTES
Subjective Patient transferring care upon moved to [facility]    REASON FOR FOLLOWUP: History of DCIS left breast 2007                                  History of Present Illness   The patient is now a 46-year-old female seen in January 2022 by GI medicine presenting to [facility] care.  She has a known history of gestational diabetes, IBS diarrhea and been treated with Lotronex successfully.  She was seen by Dr. Blanco 1/18/2022 having moved to [place] in February of this year and with a history of IBS initially treated with Viberzi which was unsuccessful and the patient, thereafter, was treated successfully with Lotronex.  Plans were made for subsequent colonoscopy and, additionally, oncology recommendation was requested as result of a previous history of ductal carcinoma in situ.     Her records concerning this included review by Dr. Deepika Tejada of [facility] on 2/12/2020 with a history of DCIS (ER positive) status post lumpectomy and XRT in 2007 followed by tamoxifen x3 years discontinued 2010.  The patient describes genetic assessment for likely BRCA in 2007 which was negative.  The patient's been followed by yearly mammography and possibly every third year MRI.  She has been clinically stable without recurrence undergoing genetic assessment which was evidently unremarkable and also reviewed and treated for iron deficiency anemia.  Patient studies date from 2/12/2020 with ferritin 29.5, B12 906.5, vitamin D of 30.7, CMP with BUN/creatinine of 13.5 and 0.67, calcium 9.6, normal LFTs, iron of 115, TIBC of 3 1736% saturation, LDH of 142  The patient is initially seen 2/24/2022 at [facility] office and we discussed her history as well as periodic symptoms of left upper extremity tremor, recent ParaGard placement in 2018 for irregular menses, associated iron deficiency and need for additional genetic assessment with extended panel.    The patient subsequent testing  included a normal CBC, normal ferritin, iron profile with 10% saturation, CMP normal.  Diagnostic mammography are compared to 2016 along with ultrasound with no evidence in either modality of malignancy in either breast.    The patient underwent colonoscopy 3/21 with no significant abnormalities, biopsy of the descending colon revealed benign colonic mucosa, rectal biopsy was also negative.    The patient is seen 4/21/2022 with recent genetic assessment just drawn, plans for GYN assessment in the next several months and improvement in her IBS symptoms initially intolerant to Xifaxan but improved with Pamelor and Lotronex.  Her menses, ever, remain somewhat erratic even now.      Past Medical History:   Diagnosis Date   • Breast cancer (HCC)    • Cancer (HCC)     breast cancer 2007 DCIS   • Gestational diabetes    • Hx of radiation therapy    • IBS (irritable bowel syndrome)    • Low blood pressure         Past Surgical History:   Procedure Laterality Date   • BREAST LUMPECTOMY  2007   • COLONOSCOPY  approx 2011    negative per pt    • COLONOSCOPY N/A 03/21/2022    Procedure: COLONOSCOPY to cecum and TI with biopsies;  Surgeon: Tuan Blanco MD;  Location: [facility] ENDOSCOPY;  Service: Gastroenterology;  Laterality: N/A;  pre-change in bowel habits  post-hemorrhoids   • TUMOR REMOVAL      right foot during childhood        Current Outpatient Medications on File Prior to Visit   Medication Sig Dispense Refill   • alosetron (LOTRONEX) 0.5 MG tablet Take 1 tablet by mouth 2 (Two) Times a Day. 180 tablet 3   • ferrous sulfate 325 (65 FE) MG tablet Take 1 tablet by mouth Daily With Breakfast. 30 tablet 5   • multivitamin with minerals tablet tablet Take 1 tablet by mouth Daily.     • nortriptyline (PAMELOR) 10 MG capsule Take 1 capsule by mouth Every Night. 30 capsule 11   • riFAXIMin (Xifaxan) 550 MG tablet Take 1 tablet by mouth Every 8 (Eight) Hours. 42 tablet 2     No current facility-administered medications on  "file prior to visit.        ALLERGIES:    Allergies   Allergen Reactions   • Penicillins Hives        Social History     Socioeconomic History   • Marital status:      Spouse name: Max   • Number of children: 2   • Years of education: College   Tobacco Use   • Smoking status: Passive Smoke Exposure - Never Smoker   • Smokeless tobacco: Never Used   • Tobacco comment: social in college    Vaping Use   • Vaping Use: Never used   Substance and Sexual Activity   • Alcohol use: Yes     Comment: a glass of wine per night    • Drug use: Never        Family History   Adopted: Yes   Problem Relation Age of Onset   • Malig Hyperthermia Neg Hx         Review of Systems   Constitutional: Negative.    HENT: Negative.    Eyes: Negative.    Respiratory: Negative.    Cardiovascular: Negative.    Gastrointestinal: Positive for diarrhea (See history of present illness).   Endocrine: Negative.    Genitourinary: Negative.    Musculoskeletal: Negative.    Skin: Negative.    Allergic/Immunologic: Negative.    Neurological: Positive for tremors (Left upper extremity, episodic).   Hematological: Negative.    Psychiatric/Behavioral: Negative.        Objective     Vitals:    04/21/22 1126   BP: 116/77   Pulse: 78   Resp: 16   Temp: 98 °F (36.7 °C)   TempSrc: Temporal   SpO2: 100%   Weight: 50.1 kg (110 lb 6.4 oz)   Height: 162.6 cm (64.02\")   PainSc: 0-No pain     Current Status 4/21/2022   ECOG score 0       Physical Exam  Constitutional:       Appearance: Normal appearance. She is normal weight.   HENT:      Head: Normocephalic and atraumatic.      Nose: Nose normal.      Mouth/Throat:      Mouth: Mucous membranes are moist.      Pharynx: Oropharynx is clear.   Eyes:      Extraocular Movements: Extraocular movements intact.      Conjunctiva/sclera: Conjunctivae normal.      Pupils: Pupils are equal, round, and reactive to light.   Cardiovascular:      Rate and Rhythm: Normal rate and regular rhythm.      Pulses: Normal pulses.     "  Heart sounds: Normal heart sounds.   Pulmonary:      Effort: Pulmonary effort is normal.      Breath sounds: Normal breath sounds.      Comments: Patient status post left breast lumpectomy, well-healed, radiation tattoos recognized, no additional abnormalities  Abdominal:      General: Bowel sounds are normal.      Palpations: Abdomen is soft.   Musculoskeletal:         General: Normal range of motion.      Cervical back: Normal range of motion and neck supple.   Skin:     General: Skin is warm and dry.   Neurological:      General: No focal deficit present.      Mental Status: She is alert and oriented to person, place, and time.   Psychiatric:         Mood and Affect: Mood normal.         Behavior: Behavior normal.           RECENT LABS:  Hematology WBC   Date Value Ref Range Status   04/21/2022 5.48 3.40 - 10.80 10*3/mm3 Final     RBC   Date Value Ref Range Status   04/21/2022 3.83 3.77 - 5.28 10*6/mm3 Final     Hemoglobin   Date Value Ref Range Status   04/21/2022 12.2 12.0 - 15.9 g/dL Final     Hematocrit   Date Value Ref Range Status   04/21/2022 36.0 34.0 - 46.6 % Final     Platelets   Date Value Ref Range Status   04/21/2022 222 140 - 450 10*3/mm3 Final          Assessment/Plan     46-year-old female with history of:    *DCIS left breast 2007 status post lumpectomy, radiation therapy and 3 years of tamoxifen.  Patient describes the tumor was ER positive but is unclear about additional testing.  · She was seen in consultation 2/24/2022.  ·  subsequent testing included a normal CBC, normal ferritin, iron profile with 10% saturation, CMP normal.  Diagnostic mammography are compared to 2016 along with ultrasound with no evidence in either modality of malignancy in either breast.  · Genetic assessment just initiated 4/21/2022 with results pending    *IBS  · The patient underwent colonoscopy 3/21 with no significant abnormalities, biopsy of the descending colon revealed benign colonic mucosa, rectal biopsy was  also negative.  · Recent improvement on Pamelor Lotronex    *Erratic menses  · Gynecologic consultation pending    *Iron deficiency anemia  · Placed on oral iron after visit 2/24/2022  · Reassessment of iron status 15 weeks, MD 16 weeks

## 2022-04-28 ENCOUNTER — TELEPHONE (OUTPATIENT)
Dept: OTHER | Facility: HOSPITAL | Age: 47
End: 2022-04-28

## 2022-04-29 ENCOUNTER — DOCUMENTATION (OUTPATIENT)
Dept: OTHER | Facility: HOSPITAL | Age: 47
End: 2022-04-29

## 2022-04-29 NOTE — PROGRESS NOTES
Patti Hogue, a 46-year-old female, was referred for genetic counseling due to a personal history of breast cancer. Ms. Hogue was diagnosed with a left-sided DCIS in 2007. She was treated with a lumpectomy and was on Tamoxifen from 2007 to 2010. She reports having negative testing for BRCA1/2 at that time. She is pre-menopausal and retains her uterus and ovaries. Ms. Hogue’s most recent mammogram was in March 2022 and normal. Ms. Hogue had a colonoscopy in March 2022 that was normal.  She was interested in discussing her risk for a hereditary cancer syndrome.   Ms. Hogue decided to pursue comprehensive genetic testing to evaluate her risk of cancer, therefore the CancerNext Panel was ordered through [employer] which analyzes 36 genes associated with an increased cancer risk. Genetic testing was negative for known deleterious/pathogenic mutations in the 36 genes included on this panel.  While no known deleterious mutations were identified, a variant of uncertain significance (VUS) was identified in the NF1 gene.  VUSs are frequently reported through multigene panel testing, given the number of genes being evaluated and the presence of genetic variation in the population.  The majority (estimated to be >90%) of VUS’s are eventually reclassified as benign gene variation. It is not recommended that any unaffected relatives be tested for a VUS since this is not a clinically actionable finding, and this does not impact management for Ms. Hogue in any way.  These results were discussed with Ms. Hogue by telephone on 4/28/22.     PERTINENT FAMILY HISTORY: (See attached pedigree)     Ms. Hogue was adopted and has no information on her biological family.     RISK ASSESSMENT:  Ms. Hogue’s personal history of breast cancer raises the question of a hereditary cancer syndrome.  NCCN guidelines for genetic testing for BRCA1/2 states that individuals with breast cancer under the age of 45 may consider genetic testing. With Ms.  Lennie’s diagnosis being at age 31, she would clearly meet this criteria. This risk assessment is based on the family history information provided at the time of the appointment.  The assessment could change in the future should new information be obtained.    GENETIC COUNSELING (30 minutes):  We reviewed the family history information in detail. Cases of cancer follow three general patterns: sporadic, familial, and hereditary.  While most cancer is sporadic, some cases appear to occur in family clusters.  These cases are said to be familial and account for 10-20% of cancer cases.  Familial cases may be due to a combination of shared genes and environmental factors among family members.  In even fewer cases, the risk for cancer is inherited, and the genes responsible for the increased cancer risk are known.       Family histories typical of hereditary cancer syndromes usually include multiple first- and second-degree relatives diagnosed with cancer types that define a syndrome.  These cases tend to be diagnosed at younger-than-expected ages and can be bilateral or multifocal.  The cancer in these families follows an autosomal dominant inheritance pattern, which indicates the likely presence of a mutation in a cancer susceptibility gene.  Children and siblings of an individual believed to carry this mutation have a 50% chance of inheriting that mutation, thereby inheriting the increased risk to develop cancer.  These mutations can be passed down from the maternal or the paternal lineage.     Due to Ms. Hogue’s personal history of breast cancer, we discussed hereditary breast cancer. Hereditary breast cancer accounts for 5-10% of all cases of breast cancer.  A significant proportion (50%) of hereditary breast cancer can be attributed to mutations in the BRCA1 and BRCA2 genes.  Mutations in these genes confer an increased risk for breast cancer, ovarian cancer, male breast cancer, prostate cancer, and pancreatic cancer.   Women with a BRCA1 or BRCA2 mutation have up to an 87% lifetime risk of breast cancer and up to a 44% risk of ovarian cancer.  There are other clinically significant breast cancer related genes in addition to BRCA1/2, including PALB2, DEREJE, and CHEK2.     There are other hereditary cancer syndromes as well. Since Ms. Sesay has no information on her biological family history and her desire to get more information regarding her personal risks, testing was pursued through a multigene panel evaluating several other genes known to increase the risk for cancer.       GENETIC TESTING:  The risks, benefits, and limitations of genetic testing and implications for clinical management following testing were reviewed.  DNA test results can influence decisions regarding screening and prevention.  Genetic testing can have significant psychological implications for both individuals and families. Also discussed was the possibility of employment and insurance discrimination based on genetic test results and the laws in place to prevent this, as well as the limitations of these laws.       We discussed panel testing, which would involve testing 36genes associated with increased cancer risk. The implications of a positive or negative test result were discussed. In general, a negative genetic test result is most informative if a mutation has first been established in an affected member of the family. We discussed the possibility that, in some cases, genetic test results may be ambiguous due to the identification of a genetic variant. These variants may or may not be associated with an increased cancer risk. With multigene panel testing, it is not uncommon for a variant of uncertain significance (VUS) to be identified.  If a VUS is identified, testing family members is typically not recommended and screening recommendations are made based on the family history.  The laboratories that perform genetic testing work to reclassify the VUS  and send out an amended report if and when a VUS is reclassified.  The majority of variant findings are ultimately reclassified to a negative result. Given Ms. Hogue’s unknown family history, a negative test result does not eliminate all cancer risk, although the risk would not be as high as it would with positive genetic testing.     TEST RESULTS:  Genetic testing was negative for known deleterious mutations by sequencing and rearrangement testing for the 36 genes on the CancerNext panel.   One VUS was identified in the NF1 gene. VUSs are not clinically actionable findings, and therefore this result does not impact management in any way or lead to testing for relatives.  The vast majority of VUSs are ultimately reclassified to benign variation.  The identification of a VUS is common in multigene panel testing, given the number of genes being evaluated and the presence of genetic variation in the population.  If this variant is ever reclassified, a new report will be issued by the laboratory and released directly to the ordering physician, and our office. This assessment is based on the information provided at the time of the consultation.   Cancer Prevention:  Despite the genetic test results that were negative for known deleterious mutations, Ms. Hogue’s female relatives may have a somewhat increased lifetime risk for breast cancer based on family history.  Surveillance for individuals with a high lifetime risk of breast cancer (>20%), based on NCCN guidelines, would consist of semi-annual clinical breast exams and monthly self-breast exams starting by age 18 and annual mammography starting 10 years younger than the earliest diagnosis in the family or by age 40.  According to an American Cancer Society expert panel, annual breast MRI should be offered to women whose lifetime risk of breast cancer is 20-25 percent or more.  Relatives may have a personalized risk assessment performed to quantify their breast cancer  risk using a family history based risk model, such as the Tyrer-Cuzick model.    PLAN: Genetic testing was ordered via the CancerNext Panel through [facility]. Results are expected in 2-3 weeks and will be called out to Ms. Hogue. If she has any questions in the meantime, she is welcome to call me at [phone].      Jasmyn Rossi MS, [facility], [facility]  Licensed Certified Genetic Counselor    Cc: Patti Calderón MD

## 2022-06-08 ENCOUNTER — OFFICE VISIT (OUTPATIENT)
Dept: INTERNAL MEDICINE | Facility: CLINIC | Age: 47
End: 2022-06-08

## 2022-06-08 VITALS
OXYGEN SATURATION: 100 % | WEIGHT: 109.79 LBS | DIASTOLIC BLOOD PRESSURE: 80 MMHG | SYSTOLIC BLOOD PRESSURE: 110 MMHG | TEMPERATURE: 98 F | BODY MASS INDEX: 18.74 KG/M2 | HEART RATE: 76 BPM | HEIGHT: 64 IN

## 2022-06-08 DIAGNOSIS — K58.0 IRRITABLE BOWEL SYNDROME WITH DIARRHEA: ICD-10-CM

## 2022-06-08 DIAGNOSIS — Z11.59 NEED FOR HEPATITIS C SCREENING TEST: ICD-10-CM

## 2022-06-08 DIAGNOSIS — M79.89 SWELLING OF RIGHT THUMB: Primary | ICD-10-CM

## 2022-06-08 PROCEDURE — 99204 OFFICE O/P NEW MOD 45 MIN: CPT | Performed by: STUDENT IN AN ORGANIZED HEALTH CARE EDUCATION/TRAINING PROGRAM

## 2022-06-08 NOTE — PROGRESS NOTES
"  Chente Mathews D.O.  Internal Medicine  [facility]  [address], [address]  [place] [address]  [phone]      Chief Complaint  Establish Care (Thumb right hand)    SUBJECTIVE    History of Present Illness    Patti Hogue is a 46 y.o. female who presents to the office today as a new patient to establish care.   Recently moved to [place] from [facility] and [place]     Reports having a GYN appointment scheduled for October 2022.     IBS-D: follows with [facility], had normal colonoscopy 3/2022. Currently being treated with Lotronex (which has been since 2011) and most recently started on nortriptyline 10 mg with great improvement in symptoms.     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     Iron deficiency: follows with [facility] for this as well; currently taking ferrous sulfate 325 daily.     Right thumb pain: 2 weeks ago her right thumb felt tender and sore with movement but it wasn't enough to impede any activity. The next day it was swollen at the base and it looked like a sausage.   She had no injuries but states her work bag is heavy and that could be it. She went to urgent care on 5/24 and was prescribed a splint and ibuprofen and told her she had a sprain and again on 6/6 and was told she had tendonitis. The tip of her thumb doesn't feel \"normal\" like sensation is dull. Movement pain does feel better over time. No rashes, no oral ulcers, no burning with urination, no eye redness or pain.    Allergies   Allergen Reactions   • Penicillins Hives        Outpatient Medications Marked as Taking for the 6/8/22 encounter (Office Visit) with Chente Mathews, DO   Medication Sig Dispense Refill   • alosetron (LOTRONEX) 0.5 MG tablet Take 1 tablet by mouth 2 (Two) Times a Day. 180 tablet 3   • ferrous sulfate 325 (65 FE) MG " "tablet Take 1 tablet by mouth Daily With Breakfast. 30 tablet 5   • multivitamin with minerals tablet tablet Take 1 tablet by mouth Daily.     • nortriptyline (PAMELOR) 10 MG capsule Take 1 capsule by mouth Every Night. 30 capsule 11        Past Medical History:   Diagnosis Date   • Breast cancer (HCC)    • Cancer (HCC)     breast cancer 2007 DCIS   • Gestational diabetes    • Hx of radiation therapy    • IBS (irritable bowel syndrome)     with diarrhea   • Iron deficiency    • Low blood pressure      Past Surgical History:   Procedure Laterality Date   • BREAST LUMPECTOMY  2007   • COLONOSCOPY  approx 2011    negative per pt    • COLONOSCOPY N/A 03/21/2022    Procedure: COLONOSCOPY to cecum and TI with biopsies;  Surgeon: Tuan Blanco MD;  Location: [facility] ENDOSCOPY;  Service: Gastroenterology;  Laterality: N/A;  pre-change in bowel habits  post-hemorrhoids   • TUMOR REMOVAL      right foot during childhood     Family History   Adopted: Yes   Problem Relation Age of Onset   • Malig Hyperthermia Neg Hx     reports that she has never smoked. She has never used smokeless tobacco. She reports current alcohol use of about 7.0 standard drinks of alcohol per week. She reports that she does not use drugs.    OBJECTIVE    Vital Signs:   /80   Pulse 76   Temp 98 °F (36.7 °C)   Ht 161.3 cm (63.5\")   Wt 49.8 kg (109 lb 12.6 oz)   SpO2 100%   BMI 19.14 kg/m²     Physical Exam  Vitals reviewed.   Constitutional:       General: She is not in acute distress.     Appearance: Normal appearance. She is normal weight. She is not ill-appearing.   HENT:      Head: Normocephalic and atraumatic.   Eyes:      General: No scleral icterus.  Cardiovascular:      Rate and Rhythm: Normal rate and regular rhythm.      Heart sounds: Normal heart sounds. No murmur heard.  Pulmonary:      Effort: Pulmonary effort is normal. No respiratory distress.      Breath sounds: Normal breath sounds. No wheezing or rhonchi. " "  Musculoskeletal:      Right lower leg: No edema.      Left lower leg: No edema.   Neurological:      Mental Status: She is alert.   Psychiatric:         Mood and Affect: Mood normal.         Behavior: Behavior normal.         Thought Content: Thought content normal.                                     ASSESSMENT & PLAN     Diagnoses and all orders for this visit:    1. Swelling of right thumb (Primary)  -2 weeks ago her right thumb was painful and had pain with movement but it wasn't enough to impede any activity. The next day it was swollen at the base and it looked like a sausage.   -She had no injuries but states her work bag is heavy and that could be it. She went to urgent care on 5/24 and was prescribed a splint and ibuprofen and told her she had a sprain and again on 6/6 and was told she had de Quervain's tenosynovitis. The tip of her thumb doesn't feel \"normal\" like sensation is dull but is overall present. The pain with movement has slowly improved. No rashes, no oral ulcers, no burning with urination, no eye redness or pain.  -on exam the right thumb has decreased flexion due to some very slight diffuse swelling. There is no skin redness or pain with palpation.   -Finklestein's test is negative so I have low suspicion of de Quervain's tenosynovitis.  -Patient had three-view right thumb x-ray on 5/24/2020 with no significant bone or joint abnormality seen.  -urgent care's plan was to refer pt to hand specialist but she has not received a call so I will place that consult again  -will obtain ESR and CRP to look for underlying inflammation that may trigger further workup of rheumatologic condition  -     Sedimentation rate, automated  -     C-reactive protein  -     Ambulatory Referral to Hand Surgery    2. Need for hepatitis C screening test  -     Hepatitis C antibody    3. Irritable bowel syndrome with diarrhea  -follows with [facility], had normal colonoscopy 3/2022. Currently being treated with " Lotronex (which she has been since 2011) and most recently started on nortriptyline 10 mg with great improvement in symptoms.             The following social determinates of health impact the patient's medical decision making: No social determinates of health were factored in to today's visit.     Follow Up  Return in about 4 weeks (around 7/6/2022) for Annual physical.    Patient/family had no further questions at this time and verbalized understanding of the plan discussed today.

## 2022-06-09 LAB
CRP SERPL-MCNC: <1 MG/L (ref 0–10)
ERYTHROCYTE [SEDIMENTATION RATE] IN BLOOD BY WESTERGREN METHOD: 2 MM/HR (ref 0–32)
HCV AB S/CO SERPL IA: 0.1 S/CO RATIO (ref 0–0.9)

## 2022-07-07 ENCOUNTER — OFFICE VISIT (OUTPATIENT)
Dept: INTERNAL MEDICINE | Facility: CLINIC | Age: 47
End: 2022-07-07

## 2022-07-07 VITALS
BODY MASS INDEX: 18.44 KG/M2 | HEART RATE: 90 BPM | OXYGEN SATURATION: 98 % | TEMPERATURE: 98.3 F | HEIGHT: 64 IN | DIASTOLIC BLOOD PRESSURE: 80 MMHG | SYSTOLIC BLOOD PRESSURE: 118 MMHG | WEIGHT: 108 LBS

## 2022-07-07 DIAGNOSIS — Z00.00 ANNUAL PHYSICAL EXAM: Primary | ICD-10-CM

## 2022-07-07 DIAGNOSIS — Z13.220 SCREENING FOR HYPERLIPIDEMIA: ICD-10-CM

## 2022-07-07 DIAGNOSIS — N92.1 MENOMETRORRHAGIA: ICD-10-CM

## 2022-07-07 DIAGNOSIS — H61.21 IMPACTED CERUMEN OF RIGHT EAR: ICD-10-CM

## 2022-07-07 PROCEDURE — 99396 PREV VISIT EST AGE 40-64: CPT | Performed by: STUDENT IN AN ORGANIZED HEALTH CARE EDUCATION/TRAINING PROGRAM

## 2022-07-07 PROCEDURE — 99213 OFFICE O/P EST LOW 20 MIN: CPT | Performed by: STUDENT IN AN ORGANIZED HEALTH CARE EDUCATION/TRAINING PROGRAM

## 2022-07-07 NOTE — PROGRESS NOTES
Chente Mathews D.O.  Internal Medicine  [facility]  [address], [address]  [place] [address]  [phone]    Chief Complaint  Annual checkup    HISTORY    Patti Hogue is a 46 y.o. female who presents to the office today as a  an established patient for their annual preventative exam.     No hospitalization(s) within the last year.     Status of chronic medical conditions:    IBS-D: follows with [facility], had normal colonoscopy 3/2022. Currently being treated with Lotronex (which has been since 2011) and most recently started on nortriptyline 10 mg with great improvement in symptoms.      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      Iron deficiency: follows with [facility] for this as well; currently taking ferrous sulfate 325 daily.     First day of last menstrual period if pre-menopausal: 6/16/22  Has an IUD and menstrual flow is heavier during first few days, heavier than it has ever been. This has been the case for the last 3 years. Menstrual periods are not regular.    Patient's contraception status (if applicable): IUD copper      Health Maintenance Summary          Overdue - PAP SMEAR (Every 3 Years) Overdue - never done    No completion history exists for this topic.          INFLUENZA VACCINE (Yearly - October to March) Next due on 10/1/2022    11/11/2021  Imm Admin: Influenza, Unspecified          MAMMOGRAM (Yearly) Next due on 3/28/2023    03/28/2022  Mammo Diagnostic Digital Tomosynthesis Bilateral With CAD    02/11/2020  SCANNED - MAMMO          ANNUAL PHYSICAL (Yearly) Next due on 7/8/2023 07/07/2022  Done          TDAP/TD VACCINES (2 - Tdap) Next due on 1/1/2026 01/01/2016  Imm Admin: Td          COLORECTAL CANCER SCREENING (COLONOSCOPY - Every 10 Years) Next due on 3/21/2032     03/21/2022  COLONOSCOPY    03/21/2022  Surgical Procedure: COLONOSCOPY          COVID-19 Vaccine (Series Information) Completed    11/27/2021  Imm Admin: COVID-19 (MODERNA) 1st, 2nd, 3rd Dose Only    04/14/2021  Imm Admin: COVID-19 (MODERNA) 1st, 2nd, 3rd Dose Only    03/17/2021  Imm Admin: COVID-19 (MODERNA) 1st, 2nd, 3rd Dose Only          HEPATITIS C SCREENING  Completed    06/08/2022  Hep C Virus Ab component of Hepatitis C antibody          Pneumococcal Vaccine 0-64 (Series Information) Aged Out    No completion history exists for this topic.                 Allergies   Allergen Reactions   • Penicillins Hives        Outpatient Medications Marked as Taking for the 7/7/22 encounter (Office Visit) with Chente Mathews,    Medication Sig Dispense Refill   • alosetron (LOTRONEX) 0.5 MG tablet Take 1 tablet by mouth 2 (Two) Times a Day. 180 tablet 3   • ferrous sulfate 325 (65 FE) MG tablet Take 1 tablet by mouth Daily With Breakfast. 30 tablet 5   • multivitamin with minerals tablet tablet Take 1 tablet by mouth Daily.     • nortriptyline (PAMELOR) 10 MG capsule Take 1 capsule by mouth Every Night. 30 capsule 11        Past Medical History:   Diagnosis Date   • Breast cancer (HCC)    • Cancer (HCC)     breast cancer 2007 DCIS   • Gestational diabetes    • Hx of radiation therapy    • IBS (irritable bowel syndrome)     with diarrhea   • Iron deficiency    • Low blood pressure    • Trigger finger of right thumb     s/p steroid injection with hand surgery     Past Surgical History:   Procedure Laterality Date   • BREAST LUMPECTOMY  2007   • COLONOSCOPY  approx 2011    negative per pt    • COLONOSCOPY N/A 03/21/2022    Procedure: COLONOSCOPY to cecum and TI with biopsies;  Surgeon: Tuan Blanco MD;  Location: [facility] ENDOSCOPY;  Service: Gastroenterology;  Laterality: N/A;  pre-change in bowel habits  post-hemorrhoids   • TUMOR REMOVAL      right foot during childhood     Family History   Adopted: Yes   Problem  "Relation Age of Onset   • Malig Hyperthermia Neg Hx     reports that she has never smoked. She has never used smokeless tobacco. She reports current alcohol use of about 7.0 standard drinks of alcohol per week. She reports that she does not use drugs.    Immunization History   Administered Date(s) Administered   • COVID-19 (MODERNA) 1st, 2nd, 3rd Dose Only 03/17/2021, 04/14/2021, 11/27/2021   • Influenza, Unspecified 11/11/2021   • Td 01/01/2016        OBJECTIVE    Vital Signs:   /80   Pulse 90   Temp 98.3 °F (36.8 °C) (Temporal)   Ht 161.3 cm (63.5\")   Wt 49 kg (108 lb)   SpO2 98%   BMI 18.83 kg/m²     Physical Exam  Vitals reviewed.   Constitutional:       General: She is not in acute distress.     Appearance: Normal appearance. She is normal weight. She is not ill-appearing.   HENT:      Head: Normocephalic and atraumatic.      Right Ear: External ear normal. There is impacted cerumen.      Left Ear: Tympanic membrane, ear canal and external ear normal. There is no impacted cerumen.      Mouth/Throat:      Mouth: Mucous membranes are moist.      Pharynx: No oropharyngeal exudate or posterior oropharyngeal erythema.   Eyes:      General: No scleral icterus.     Extraocular Movements: Extraocular movements intact.      Conjunctiva/sclera: Conjunctivae normal.      Pupils: Pupils are equal, round, and reactive to light.   Cardiovascular:      Rate and Rhythm: Normal rate and regular rhythm.      Heart sounds: Normal heart sounds. No murmur heard.  Pulmonary:      Effort: Pulmonary effort is normal. No respiratory distress.      Breath sounds: Normal breath sounds. No wheezing.   Abdominal:      General: Bowel sounds are normal. There is no distension.      Palpations: Abdomen is soft.      Tenderness: There is no abdominal tenderness. There is no guarding.   Musculoskeletal:      Cervical back: Neck supple. No tenderness.      Right lower leg: No edema.      Left lower leg: No edema.   Lymphadenopathy: "      Cervical: No cervical adenopathy.   Skin:     General: Skin is warm and dry.      Coloration: Skin is not jaundiced.   Neurological:      General: No focal deficit present.      Mental Status: She is alert and oriented to person, place, and time.      Cranial Nerves: No cranial nerve deficit.      Motor: No weakness.   Psychiatric:         Mood and Affect: Mood normal.         Behavior: Behavior normal.         Thought Content: Thought content normal.                         ASSESSMENT & PLAN     #Annual Preventative Health Examination   -Age and sex appropriate physical exam performed and documented. Updated past medical, family, social and surgical histories as well as allergies and care team list. Addressed care gaps listed in the medical record.  -Encouraged seat belt use for every car ride for patient and all occupants.  Encouraged sunscreen use to reduce risk of skin cancer for any days with sun exposure over 20 minutes. Discussed the importance of smoke and carbon monoxide detectors in the home.   -Encouraged annual dental and vision exams as part of their overall health.  -Encouraged minimum of 30 minutes or more of exercise at a brisk walk or higher 5 days per week combined with a well-balanced diet.  -Immunizations reviewed and updated in EMR.  -Lipid screening:  Will screen for hyperlipidemia today and calculate ASCVD risk if appropriate.    -Aspirin for primary or secondary prevention: Not applicable, patient is less than age 50.  -Depression screening: PHQ2 performed and the patient's screen was negative.  -Diabetes screening:  Screening not indicated at this time.   -Tobacco use screening: Patient denies cigarette use. Tobacco counseling was was not indicated.  -Alcohol use screening: Patient reports drinking 7 drinks per week.. Alcohol abuse counseling was was not indicated.  -Illicit drug screening: Patient does not use illicit drugs.  -Hypertension screening: Patient screened negative for HTN  today.  -HIV screening: Discussed with patient the importance of identifying asymptomatic HIV infection for adults in their age group with a one time screening test .Patient declined screening.   -Syphilis screening: Syphilis screening not indicated.  -Hepatitis B virus screening: Screening not indicated, not in a high-risk group.  -Hepatitis C virus screening:  Negative screening on file  -Colon cancer screening: Patient is already up to date on their colon cancer screening with colonoscopy is indicated again in 3/2022  -Lung cancer screening: Patient is less than age 55, screening not indicated.  -Cervical cancer screening:  Informed patient that the USPSTF recommends screening for cervical cancer every 3 years with cervical cytology alone in women aged 21 to 29 years. For women aged 30 to 65 years, the USPSTF recommends screening every 3 years with cervical cytology alone, every 5 years with high-risk human papillomavirus (hrHPV) testing alone, or every 5 years with HPV testing in combination with cytology (cotesting). Pt reports last PAP smear was April 2019 and it was negative per her report.   -Breast cancer screening: Informed patient that the USPSTF recommends biennial screening mammography for women aged 50 to 74 years. was done on approximately 3/2022 and the result was: Birads II (Benign findings). Also follows with [facility] for history of Breast Cancer  -Osteoporosis screening: Informed patient that the USPSTF recommends screening for osteoporosis with bone measurement testing to prevent osteoporotic fractures in women 65 years and older. screening is not indicated at this time.     Follow up in 1 year for annual physical exam.    A problem-based visit was also conducted on the same day, see below for assessment and plan    Diagnoses and all orders for this visit:    1. Menometrorrhagia (Primary)  -years long duration  -has a copper IUD in place, reports that she may have a heavy period for 2  days that requires a tampon every hour followed by a shorter period 2 weeks later  -CBC 2 months ago was normal but she is receiving ferrous sulfate from hematology ... this could definitely be the etiology  -she is scheduled for establish care visit with GYN in October , advised pt that I can try to get her into another provider more urgently but she would like to keep her current appointment; contact office if she is interested in changing      2. Impacted cerumen of right ear  -avoid q tips  -try OTC ear wax softening drops, can always consider irrigation in the future           The following social determinates of health impact the patient's medical decision making: No social determinates of health were factored in to today's visit.     Follow Up  Return in about 1 year (around 7/7/2023) for Annual physical.      Patient/family had no further questions at this time and verbalized understanding of the plan discussed today.

## 2022-07-08 LAB
CHOLEST SERPL-MCNC: 188 MG/DL (ref 100–199)
HDLC SERPL-MCNC: 101 MG/DL
LDLC SERPL CALC-MCNC: 75 MG/DL (ref 0–99)
LDLC/HDLC SERPL: 0.7 RATIO (ref 0–3.2)
TRIGL SERPL-MCNC: 64 MG/DL (ref 0–149)
VLDLC SERPL CALC-MCNC: 12 MG/DL (ref 5–40)

## 2022-08-05 ENCOUNTER — LAB (OUTPATIENT)
Dept: LAB | Facility: HOSPITAL | Age: 47
End: 2022-08-05

## 2022-08-05 DIAGNOSIS — D50.0 IRON DEFICIENCY ANEMIA DUE TO CHRONIC BLOOD LOSS: ICD-10-CM

## 2022-08-05 LAB
BASOPHILS # BLD AUTO: 0.03 10*3/MM3 (ref 0–0.2)
BASOPHILS NFR BLD AUTO: 0.6 % (ref 0–1.5)
DEPRECATED RDW RBC AUTO: 44 FL (ref 37–54)
EOSINOPHIL # BLD AUTO: 0.05 10*3/MM3 (ref 0–0.4)
EOSINOPHIL NFR BLD AUTO: 1 % (ref 0.3–6.2)
ERYTHROCYTE [DISTWIDTH] IN BLOOD BY AUTOMATED COUNT: 12.3 % (ref 12.3–15.4)
FERRITIN SERPL-MCNC: 58.3 NG/ML (ref 11–207)
HCT VFR BLD AUTO: 39 % (ref 34–46.6)
HGB BLD-MCNC: 13.1 G/DL (ref 12–15.9)
IMM GRANULOCYTES # BLD AUTO: 0.01 10*3/MM3 (ref 0–0.05)
IMM GRANULOCYTES NFR BLD AUTO: 0.2 % (ref 0–0.5)
IRON 24H UR-MRATE: 80 MCG/DL (ref 37–145)
IRON SATN MFR SERPL: 22 % (ref 14–48)
LYMPHOCYTES # BLD AUTO: 1.29 10*3/MM3 (ref 0.7–3.1)
LYMPHOCYTES NFR BLD AUTO: 26.1 % (ref 19.6–45.3)
MCH RBC QN AUTO: 32.5 PG (ref 26.6–33)
MCHC RBC AUTO-ENTMCNC: 33.6 G/DL (ref 31.5–35.7)
MCV RBC AUTO: 96.8 FL (ref 79–97)
MONOCYTES # BLD AUTO: 0.34 10*3/MM3 (ref 0.1–0.9)
MONOCYTES NFR BLD AUTO: 6.9 % (ref 5–12)
NEUTROPHILS NFR BLD AUTO: 3.23 10*3/MM3 (ref 1.7–7)
NEUTROPHILS NFR BLD AUTO: 65.2 % (ref 42.7–76)
NRBC BLD AUTO-RTO: 0 /100 WBC (ref 0–0.2)
PLATELET # BLD AUTO: 212 10*3/MM3 (ref 140–450)
PMV BLD AUTO: 8.7 FL (ref 6–12)
RBC # BLD AUTO: 4.03 10*6/MM3 (ref 3.77–5.28)
TIBC SERPL-MCNC: 364 MCG/DL (ref 249–505)
TRANSFERRIN SERPL-MCNC: 260 MG/DL (ref 200–360)
WBC NRBC COR # BLD: 4.95 10*3/MM3 (ref 3.4–10.8)

## 2022-08-05 PROCEDURE — 83540 ASSAY OF IRON: CPT

## 2022-08-05 PROCEDURE — 82728 ASSAY OF FERRITIN: CPT

## 2022-08-05 PROCEDURE — 36415 COLL VENOUS BLD VENIPUNCTURE: CPT

## 2022-08-05 PROCEDURE — 85025 COMPLETE CBC W/AUTO DIFF WBC: CPT

## 2022-08-05 PROCEDURE — 84466 ASSAY OF TRANSFERRIN: CPT

## 2022-08-10 NOTE — PROGRESS NOTES
Subjective Feeling significantly improved, gynecologic follow-up scheduled    REASON FOR FOLLOWUP: History of DCIS left breast 2007                                  History of Present Illness   The patient is now a 46-year-old female seen in January 2022 by GI medicine presenting to [facility] care.  She has a known history of gestational diabetes, IBS diarrhea and been treated with Lotronex successfully.  She was seen by Dr. Blanco 1/18/2022 having moved to [place] in February of this year and with a history of IBS initially treated with Viberzi which was unsuccessful and the patient, thereafter, was treated successfully with Lotronex.  Plans were made for subsequent colonoscopy and, additionally, oncology recommendation was requested as result of a previous history of ductal carcinoma in situ.     Her records concerning this included review by Dr. Deepika Tejada of [facility] on 2/12/2020 with a history of DCIS (ER positive) status post lumpectomy and XRT in 2007 followed by tamoxifen x3 years discontinued 2010.  The patient describes genetic assessment for likely BRCA in 2007 which was negative.  The patient's been followed by yearly mammography and possibly every third year MRI.  She has been clinically stable without recurrence undergoing genetic assessment which was evidently unremarkable and also reviewed and treated for iron deficiency anemia.  Patient studies date from 2/12/2020 with ferritin 29.5, B12 906.5, vitamin D of 30.7, CMP with BUN/creatinine of 13.5 and 0.67, calcium 9.6, normal LFTs, iron of 115, TIBC of 3 1736% saturation, LDH of 142  The patient is initially seen 2/24/2022 at [facility] office and we discussed her history as well as periodic symptoms of left upper extremity tremor, recent ParaGard placement in 2018 for irregular menses, associated iron deficiency and need for additional genetic assessment with extended panel.    The patient subsequent testing  included a normal CBC, normal ferritin, iron profile with 10% saturation, CMP normal.  Diagnostic mammography are compared to 2016 along with ultrasound with no evidence in either modality of malignancy in either breast.    The patient underwent colonoscopy 3/21 with no significant abnormalities, biopsy of the descending colon revealed benign colonic mucosa, rectal biopsy was also negative.    The patient is seen 4/21/2022 with recent genetic assessment just drawn, plans for GYN assessment in the next several months and improvement in her IBS symptoms initially intolerant to Xifaxan but improved with Pamelor and Lotronex.  Her menses, ever, remain somewhat erratic even now.    The patient is next assessed 8/11/2022 with normalization of her CBC as well as iron stores.  Her genetics assessment has been completed with variant of unknown significance detected-NF1.  Patient states that her stamina has improved and though her menses are again erratic and often annoying that she feels relatively well.  She has gynecologic follow-up now scheduled.      Past Medical History:   Diagnosis Date   • Breast cancer (HCC)    • Cancer (HCC)     breast cancer 2007 DCIS   • Gestational diabetes    • Hx of radiation therapy    • IBS (irritable bowel syndrome)     with diarrhea   • Iron deficiency    • Low blood pressure    • Trigger finger of right thumb     s/p steroid injection with hand surgery        Past Surgical History:   Procedure Laterality Date   • BREAST LUMPECTOMY  2007   • COLONOSCOPY  approx 2011    negative per pt    • COLONOSCOPY N/A 03/21/2022    Procedure: COLONOSCOPY to cecum and TI with biopsies;  Surgeon: Tuan Blanco MD;  Location: [facility] ENDOSCOPY;  Service: Gastroenterology;  Laterality: N/A;  pre-change in bowel habits  post-hemorrhoids   • TUMOR REMOVAL      right foot during childhood        Current Outpatient Medications on File Prior to Visit   Medication Sig Dispense Refill   • alosetron  "(LOTRONEX) 0.5 MG tablet Take 1 tablet by mouth 2 (Two) Times a Day. 180 tablet 3   • ferrous sulfate 325 (65 FE) MG tablet Take 1 tablet by mouth Daily With Breakfast. 30 tablet 5   • multivitamin with minerals tablet tablet Take 1 tablet by mouth Daily.     • nortriptyline (PAMELOR) 10 MG capsule Take 1 capsule by mouth Every Night. 30 capsule 11     No current facility-administered medications on file prior to visit.        ALLERGIES:    Allergies   Allergen Reactions   • Penicillins Hives        Social History     Socioeconomic History   • Marital status:      Spouse name: Max   • Number of children: 2   • Years of education: College   Tobacco Use   • Smoking status: Never Smoker   • Smokeless tobacco: Never Used   • Tobacco comment: social in college    Vaping Use   • Vaping Use: Never used   Substance and Sexual Activity   • Alcohol use: Yes     Alcohol/week: 7.0 standard drinks     Types: 7 Standard drinks or equivalent per week   • Drug use: Never        Family History   Adopted: Yes   Problem Relation Age of Onset   • Malig Hyperthermia Neg Hx         Review of Systems   Constitutional: Negative.    HENT: Negative.    Eyes: Negative.    Respiratory: Negative.    Cardiovascular: Negative.    Gastrointestinal: Positive for diarrhea (See history of present illness).   Endocrine: Negative.    Genitourinary: Positive for menstrual problem.   Musculoskeletal: Negative.    Skin: Negative.    Allergic/Immunologic: Negative.    Neurological: Positive for tremors (Left upper extremity, episodic).   Hematological: Negative.    Psychiatric/Behavioral: Negative.        Objective     Vitals:    08/11/22 1055   BP: 109/72   Pulse: 83   Resp: 16   Temp: 98.2 °F (36.8 °C)   TempSrc: Temporal   SpO2: 99%   Weight: 49.3 kg (108 lb 9.6 oz)   Height: 161.3 cm (63.5\")   PainSc: 0-No pain     Current Status 8/11/2022   ECOG score 0       Physical Exam  Constitutional:       Appearance: Normal appearance. She is normal " weight.   HENT:      Head: Normocephalic and atraumatic.      Nose: Nose normal.      Mouth/Throat:      Mouth: Mucous membranes are moist.      Pharynx: Oropharynx is clear.   Eyes:      Extraocular Movements: Extraocular movements intact.      Conjunctiva/sclera: Conjunctivae normal.      Pupils: Pupils are equal, round, and reactive to light.   Cardiovascular:      Rate and Rhythm: Normal rate and regular rhythm.      Pulses: Normal pulses.      Heart sounds: Normal heart sounds.   Pulmonary:      Effort: Pulmonary effort is normal.      Breath sounds: Normal breath sounds.      Comments: Patient status post left breast lumpectomy, well-healed, radiation tattoos recognized, no additional abnormalities  Abdominal:      General: Bowel sounds are normal.      Palpations: Abdomen is soft.   Musculoskeletal:         General: Normal range of motion.      Cervical back: Normal range of motion and neck supple.   Skin:     General: Skin is warm and dry.   Neurological:      General: No focal deficit present.      Mental Status: She is alert and oriented to person, place, and time.   Psychiatric:         Mood and Affect: Mood normal.         Behavior: Behavior normal.           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  Patient describes the tumor was ER positive but is unclear about additional testing.  · She was seen in consultation 2/24/2022.  ·  subsequent testing  included a normal CBC, normal ferritin, iron profile with 10% saturation, CMP normal.  Diagnostic mammography are compared to 2016 along with ultrasound with no evidence in either modality of malignancy in either breast.  · Genetic assessment just initiated 4/21/2022 with results- Variant of unknown significance detected-NF1    *IBS  · The patient underwent colonoscopy 3/21 with no significant abnormalities, biopsy of the descending colon revealed benign colonic mucosa, rectal biopsy was also negative.  · Recent improvement on Pamelor Lotronex    *Erratic menses  · Gynecologic consultation pending    *Iron deficiency anemia  · Placed on oral iron after visit 2/24/2022  · Reassessment of iron status 8/5/2022 finds normal iron profile, ferritin and CBC, oral iron discontinued      Plan:  *Discontinue oral iron    *15 weeks repeat studies for iron status CBC    *16 weeks MD    *Gynecologic follow-up now scheduled

## 2022-08-11 ENCOUNTER — OFFICE VISIT (OUTPATIENT)
Dept: ONCOLOGY | Facility: CLINIC | Age: 47
End: 2022-08-11

## 2022-08-11 ENCOUNTER — APPOINTMENT (OUTPATIENT)
Dept: LAB | Facility: HOSPITAL | Age: 47
End: 2022-08-11

## 2022-08-11 VITALS
BODY MASS INDEX: 18.54 KG/M2 | RESPIRATION RATE: 16 BRPM | HEART RATE: 83 BPM | OXYGEN SATURATION: 99 % | DIASTOLIC BLOOD PRESSURE: 72 MMHG | HEIGHT: 64 IN | TEMPERATURE: 98.2 F | WEIGHT: 108.6 LBS | SYSTOLIC BLOOD PRESSURE: 109 MMHG

## 2022-08-11 DIAGNOSIS — D50.0 IRON DEFICIENCY ANEMIA DUE TO CHRONIC BLOOD LOSS: Primary | ICD-10-CM

## 2022-08-11 PROCEDURE — 99214 OFFICE O/P EST MOD 30 MIN: CPT | Performed by: INTERNAL MEDICINE

## 2022-09-01 RX ORDER — FERROUS SULFATE 325(65) MG
TABLET ORAL
Qty: 90 TABLET | Refills: 1 | Status: SHIPPED | OUTPATIENT
Start: 2022-09-01 | End: 2022-10-18

## 2022-10-18 ENCOUNTER — OFFICE VISIT (OUTPATIENT)
Dept: OBSTETRICS AND GYNECOLOGY | Age: 47
End: 2022-10-18

## 2022-10-18 ENCOUNTER — PATIENT ROUNDING (BHMG ONLY) (OUTPATIENT)
Dept: OBSTETRICS AND GYNECOLOGY | Age: 47
End: 2022-10-18

## 2022-10-18 VITALS
HEIGHT: 64 IN | SYSTOLIC BLOOD PRESSURE: 108 MMHG | WEIGHT: 108 LBS | DIASTOLIC BLOOD PRESSURE: 74 MMHG | BODY MASS INDEX: 18.44 KG/M2

## 2022-10-18 DIAGNOSIS — Z11.51 SPECIAL SCREENING EXAMINATION FOR HUMAN PAPILLOMAVIRUS (HPV): ICD-10-CM

## 2022-10-18 DIAGNOSIS — Z12.4 SCREENING FOR MALIGNANT NEOPLASM OF THE CERVIX: ICD-10-CM

## 2022-10-18 DIAGNOSIS — N92.1 MENORRHAGIA WITH IRREGULAR CYCLE: Primary | ICD-10-CM

## 2022-10-18 DIAGNOSIS — Z01.419 ENCOUNTER FOR GYNECOLOGICAL EXAMINATION WITHOUT ABNORMAL FINDING: ICD-10-CM

## 2022-10-18 PROBLEM — Z97.5 IUD (INTRAUTERINE DEVICE) IN PLACE: Status: ACTIVE | Noted: 2022-10-18

## 2022-10-18 PROCEDURE — 58100 BIOPSY OF UTERUS LINING: CPT | Performed by: OBSTETRICS & GYNECOLOGY

## 2022-10-18 PROCEDURE — 99396 PREV VISIT EST AGE 40-64: CPT | Performed by: OBSTETRICS & GYNECOLOGY

## 2022-10-18 PROCEDURE — 99213 OFFICE O/P EST LOW 20 MIN: CPT | Performed by: OBSTETRICS & GYNECOLOGY

## 2022-10-18 RX ORDER — CIPROFLOXACIN 500 MG/1
500 TABLET, FILM COATED ORAL 2 TIMES DAILY
COMMUNITY
Start: 2022-10-13 | End: 2022-10-18

## 2022-10-18 RX ORDER — CLINDAMYCIN PHOSPHATE 900 MG/50ML
900 INJECTION INTRAVENOUS ONCE
Status: CANCELLED | OUTPATIENT
Start: 2022-11-30 | End: 2022-10-18

## 2022-10-18 RX ORDER — ACETAMINOPHEN 500 MG
1000 TABLET ORAL ONCE
Status: CANCELLED | OUTPATIENT
Start: 2022-11-30 | End: 2022-10-18

## 2022-10-18 RX ORDER — SCOLOPAMINE TRANSDERMAL SYSTEM 1 MG/1
1 PATCH, EXTENDED RELEASE TRANSDERMAL CONTINUOUS
Status: CANCELLED | OUTPATIENT
Start: 2022-11-30 | End: 2022-12-03

## 2022-10-18 RX ORDER — SODIUM CHLORIDE 0.9 % (FLUSH) 0.9 %
10 SYRINGE (ML) INJECTION EVERY 12 HOURS SCHEDULED
Status: CANCELLED | OUTPATIENT
Start: 2022-11-30

## 2022-10-18 RX ORDER — SODIUM CHLORIDE 0.9 % (FLUSH) 0.9 %
1-10 SYRINGE (ML) INJECTION AS NEEDED
Status: CANCELLED | OUTPATIENT
Start: 2022-11-30

## 2022-10-18 NOTE — PROGRESS NOTES
A MY CHART MESSAGE HAS BEEN SENT TO THE PATIENT FOR [facility] ROUNDING.

## 2022-10-18 NOTE — PROGRESS NOTES
Subjective     Chief Complaint   Patient presents with   • Gynecologic Exam     New         History of Present Illness    Patti Hogue is a 47 y.o.  who presents for annual exam.  Patient is a new patient to me.  She has been followed by Dr. Boswell.  She has a history of DCIS of the left breast.  She has previously had lumpectomy and radiation.  She was on tamoxifen until .  Patient reports she has had full negative genetic testing.    Patient has lived in [place] and [place] previously.  She has been here in [place] for a few years and works for [employer].  She has 2 sons ages 6 and 10.    Patient's main complaint is of heavy irregular cycles.  Reports that on the first 2 days she has to change her super plus tampon about every 2 hours.  Last pelvic ultrasound was done in [place] in 2017.  Patient has a ParaGard IUD in place for contraception.  Her menses are q 2 - 4 weeks  irregular, lasting 7-10 days, very heavy for 2 days,  dysmenorrhea severe, occurring first 1-2 days of flow   Obstetric History:  OB History        3    Para   2    Term   2            AB   1    Living   2       SAB   1    IAB        Ectopic        Molar        Multiple        Live Births   2               Menstrual History:     No LMP recorded. Patient has had an implant.         Current contraception: IUD paraguard    History of abnormal Pap smear: no  Received Gardasil immunization: no  Perform regular self breast exam : yes  Family history of uterine or ovarian cancer: no  PT ADOPTED   Family History of colon cancer: no  Family history of breast cancer: no    Mammogram: up to date. 3/2022  Colonoscopy: up to date. 3/2022   DEXA: not indicated.    Exercise: no   Calcium/Vitamin D: adequate intake and uses supplements    The following portions of the patient's history were reviewed and updated as appropriate: allergies, current medications, past family history, past medical history, past social  "history, past surgical history and problem list.    Review of Systems    Review of Systems   Constitutional: Negative for fatigue.   Respiratory: Negative for shortness of breath.    Gastrointestinal: Negative for abdominal pain.   Genitourinary: Negative for dysuria.   Neurological: Negative for headaches.   Psychiatric/Behavioral: Negative for dysphoric mood.     Objective   Physical Exam    /74   Ht 161.3 cm (63.5\")   Wt 49 kg (108 lb)   Breastfeeding No   BMI 18.83 kg/m²     General:   alert, appears stated age and cooperative   Neck: thyroid normal to palpation   Heart: regular rate and rhythm   Lungs: clear to auscultation bilaterally   Abdomen: soft, non-tender, without masses or organomegaly   Breast: inspection negative, no nipple discharge or bleeding, no masses or nodularity palpable   Vulva: normal, Bartholin's, Urethra, Tarina's normal   Vagina: normal mucosa, normal discharge   Cervix: no cervical motion tenderness and no lesions   Uterus: non-tender, normal shape and consistency   Adnexa: no mass, fullness, tenderness   Rectal: not indicated     Procedure note -endometrial biopsy-after verbal informed consent the patient cervix was prepped with Betadine x3.  Cervix was injected with lidocaine at 248 and 10:00.  Cervix was stabilized with an Allis clamp and Pipelle was placed to the fundus.  Endometrial biopsy sample was obtained and sent to pathology.  Allis clamp was removed.  Patient tolerated well.    Ultrasound shows an anteverted uterus that measures 9 cm in length.  Overall dimensions are 9 x 5 x 5.  ParaGard IUD is in good position.  No fibroids or polyps are seen.  Both ovaries appear normal.      Assessment & Plan   Diagnoses and all orders for this visit:    1. Menorrhagia with irregular cycle (Primary)  -     IGP, Aptima HPV, Rfx 16 / 18,45  -     Reference Histopathology    2. Screening for malignant neoplasm of the cervix  -     IGP, Aptima HPV, Rfx 16 / 18,45    3. Special " screening examination for human papillomavirus (HPV)  -     IGP, Aptima HPV, Rfx 16 / 18,45    4. Encounter for gynecological examination without abnormal finding    Patient has heavy irregular cycles with a history of breast cancer.  She was interested in the Mirena IUD but I did review that in studies Mirena can increase risk of breast cancer and so I would not recommend that.  We discussed options.  I would recommend endometrial ablation or Lysteda to decrease the heaviness of her cycles.  We discussed endometrial ablation in detail and endometrial biopsy was sent off today.  Ultrasound is normal.  Patient wishes to proceed with endometrial ablation.  Patient would need permanent contraception.  Discussed the option of her  having a vasectomy or just careful use of condoms.  Patient prefers to proceed with laparoscopic salpingectomy.  We discussed laparoscopy in detail also.  Risk of both procedures would include but not limited to risk of anesthesia, bleeding, infection, uterine perforation, injury to internal organs and other surgical risks.  IUD would be removed at time of surgery.  Patient wishes to proceed.  Patient's most recent hemoglobin was normal.    All questions answered.  Breast self exam technique reviewed and patient encouraged to perform self-exam monthly.  Discussed healthy lifestyle modifications.  Recommended 30 minutes of aerobic exercise five times per week.  Discussed calcium needs to prevent osteoporosis.

## 2022-10-20 LAB
DX ICD CODE: NORMAL
DX ICD CODE: NORMAL
PATH REPORT.FINAL DX SPEC: NORMAL
PATH REPORT.GROSS SPEC: NORMAL
PATH REPORT.SITE OF ORIGIN SPEC: NORMAL
PATHOLOGIST NAME: NORMAL
PAYMENT PROCEDURE: NORMAL

## 2022-10-24 LAB
CYTOLOGIST CVX/VAG CYTO: NORMAL
CYTOLOGY CVX/VAG DOC CYTO: NORMAL
CYTOLOGY CVX/VAG DOC THIN PREP: NORMAL
DX ICD CODE: NORMAL
HIV 1 & 2 AB SER-IMP: NORMAL
HPV I/H RISK 4 DNA CVX QL PROBE+SIG AMP: NEGATIVE
OTHER STN SPEC: NORMAL
STAT OF ADQ CVX/VAG CYTO-IMP: NORMAL

## 2022-11-28 ENCOUNTER — PRE-ADMISSION TESTING (OUTPATIENT)
Dept: PREADMISSION TESTING | Facility: HOSPITAL | Age: 47
End: 2022-11-28

## 2022-11-28 VITALS
HEIGHT: 64 IN | DIASTOLIC BLOOD PRESSURE: 71 MMHG | WEIGHT: 111.6 LBS | RESPIRATION RATE: 16 BRPM | OXYGEN SATURATION: 99 % | TEMPERATURE: 97.2 F | BODY MASS INDEX: 19.05 KG/M2 | HEART RATE: 70 BPM | SYSTOLIC BLOOD PRESSURE: 116 MMHG

## 2022-11-28 LAB — HCG SERPL QL: NEGATIVE

## 2022-11-28 PROCEDURE — 85025 COMPLETE CBC W/AUTO DIFF WBC: CPT | Performed by: OBSTETRICS & GYNECOLOGY

## 2022-11-28 PROCEDURE — 36415 COLL VENOUS BLD VENIPUNCTURE: CPT

## 2022-11-28 PROCEDURE — 84703 CHORIONIC GONADOTROPIN ASSAY: CPT

## 2022-11-28 RX ORDER — CHLORHEXIDINE GLUCONATE 500 MG/1
CLOTH TOPICAL
COMMUNITY
End: 2022-11-30 | Stop reason: HOSPADM

## 2022-11-29 PROCEDURE — S0260 H&P FOR SURGERY: HCPCS | Performed by: OBSTETRICS & GYNECOLOGY

## 2022-11-30 ENCOUNTER — ANESTHESIA EVENT (OUTPATIENT)
Dept: PERIOP | Facility: HOSPITAL | Age: 47
End: 2022-11-30

## 2022-11-30 ENCOUNTER — HOSPITAL ENCOUNTER (OUTPATIENT)
Facility: HOSPITAL | Age: 47
Setting detail: HOSPITAL OUTPATIENT SURGERY
Discharge: HOME | End: 2022-11-30
Attending: OBSTETRICS & GYNECOLOGY | Admitting: OBSTETRICS & GYNECOLOGY

## 2022-11-30 ENCOUNTER — ANESTHESIA (OUTPATIENT)
Dept: PERIOP | Facility: HOSPITAL | Age: 47
End: 2022-11-30

## 2022-11-30 VITALS
BODY MASS INDEX: 19.77 KG/M2 | DIASTOLIC BLOOD PRESSURE: 88 MMHG | HEART RATE: 64 BPM | OXYGEN SATURATION: 100 % | RESPIRATION RATE: 16 BRPM | HEIGHT: 63 IN | TEMPERATURE: 97.7 F | WEIGHT: 111.55 LBS | SYSTOLIC BLOOD PRESSURE: 122 MMHG

## 2022-11-30 DIAGNOSIS — N92.1 MENORRHAGIA WITH IRREGULAR CYCLE: ICD-10-CM

## 2022-11-30 PROBLEM — Z97.5 IUD (INTRAUTERINE DEVICE) IN PLACE: Status: RESOLVED | Noted: 2022-10-18 | Resolved: 2022-11-30

## 2022-11-30 PROCEDURE — 58661 LAPAROSCOPY REMOVE ADNEXA: CPT | Performed by: OBSTETRICS & GYNECOLOGY

## 2022-11-30 PROCEDURE — 25010000002 PROPOFOL 10 MG/ML EMULSION: Performed by: NURSE ANESTHETIST, CERTIFIED REGISTERED

## 2022-11-30 PROCEDURE — 58563 HYSTEROSCOPY ABLATION: CPT | Performed by: OBSTETRICS & GYNECOLOGY

## 2022-11-30 PROCEDURE — 88305 TISSUE EXAM BY PATHOLOGIST: CPT | Performed by: OBSTETRICS & GYNECOLOGY

## 2022-11-30 PROCEDURE — 25010000002 ONDANSETRON PER 1 MG: Performed by: NURSE ANESTHETIST, CERTIFIED REGISTERED

## 2022-11-30 PROCEDURE — 58301 REMOVE INTRAUTERINE DEVICE: CPT | Performed by: OBSTETRICS & GYNECOLOGY

## 2022-11-30 PROCEDURE — 25010000002 NEOSTIGMINE 5 MG/10ML SOLUTION: Performed by: NURSE ANESTHETIST, CERTIFIED REGISTERED

## 2022-11-30 PROCEDURE — 25010000002 GENTAMICIN PER 80 MG: Performed by: OBSTETRICS & GYNECOLOGY

## 2022-11-30 PROCEDURE — 25010000002 HYDROMORPHONE PER 4 MG: Performed by: NURSE ANESTHETIST, CERTIFIED REGISTERED

## 2022-11-30 PROCEDURE — 88302 TISSUE EXAM BY PATHOLOGIST: CPT | Performed by: OBSTETRICS & GYNECOLOGY

## 2022-11-30 PROCEDURE — 25010000002 DEXAMETHASONE PER 1 MG: Performed by: NURSE ANESTHETIST, CERTIFIED REGISTERED

## 2022-11-30 RX ORDER — ONDANSETRON 2 MG/ML
INJECTION INTRAMUSCULAR; INTRAVENOUS AS NEEDED
Status: DISCONTINUED | OUTPATIENT
Start: 2022-11-30 | End: 2022-11-30 | Stop reason: SURG

## 2022-11-30 RX ORDER — MAGNESIUM HYDROXIDE 1200 MG/15ML
LIQUID ORAL AS NEEDED
Status: DISCONTINUED | OUTPATIENT
Start: 2022-11-30 | End: 2022-11-30 | Stop reason: HOSPADM

## 2022-11-30 RX ORDER — SODIUM CHLORIDE 0.9 % (FLUSH) 0.9 %
1-10 SYRINGE (ML) INJECTION AS NEEDED
Status: DISCONTINUED | OUTPATIENT
Start: 2022-11-30 | End: 2022-11-30 | Stop reason: HOSPADM

## 2022-11-30 RX ORDER — DIPHENHYDRAMINE HYDROCHLORIDE 50 MG/ML
12.5 INJECTION INTRAMUSCULAR; INTRAVENOUS
Status: DISCONTINUED | OUTPATIENT
Start: 2022-11-30 | End: 2022-11-30 | Stop reason: HOSPADM

## 2022-11-30 RX ORDER — CLINDAMYCIN PHOSPHATE 900 MG/50ML
900 INJECTION INTRAVENOUS ONCE
Status: COMPLETED | OUTPATIENT
Start: 2022-11-30 | End: 2022-11-30

## 2022-11-30 RX ORDER — SODIUM CHLORIDE 0.9 % (FLUSH) 0.9 %
3 SYRINGE (ML) INJECTION EVERY 12 HOURS SCHEDULED
Status: DISCONTINUED | OUTPATIENT
Start: 2022-11-30 | End: 2022-11-30 | Stop reason: HOSPADM

## 2022-11-30 RX ORDER — IBUPROFEN 600 MG/1
600 TABLET ORAL EVERY 6 HOURS PRN
Qty: 30 TABLET | Refills: 0 | Status: SHIPPED | OUTPATIENT
Start: 2022-11-30 | End: 2022-12-15

## 2022-11-30 RX ORDER — LIDOCAINE HYDROCHLORIDE 20 MG/ML
INJECTION, SOLUTION EPIDURAL; INFILTRATION; INTRACAUDAL; PERINEURAL AS NEEDED
Status: DISCONTINUED | OUTPATIENT
Start: 2022-11-30 | End: 2022-11-30 | Stop reason: SURG

## 2022-11-30 RX ORDER — HYDROMORPHONE HCL 110MG/55ML
PATIENT CONTROLLED ANALGESIA SYRINGE INTRAVENOUS AS NEEDED
Status: DISCONTINUED | OUTPATIENT
Start: 2022-11-30 | End: 2022-11-30 | Stop reason: SURG

## 2022-11-30 RX ORDER — HYDROCODONE BITARTRATE AND ACETAMINOPHEN 5; 325 MG/1; MG/1
1-2 TABLET ORAL EVERY 4 HOURS PRN
Qty: 20 TABLET | Refills: 0 | Status: SHIPPED | OUTPATIENT
Start: 2022-11-30 | End: 2022-12-15

## 2022-11-30 RX ORDER — LABETALOL HYDROCHLORIDE 5 MG/ML
5 INJECTION, SOLUTION INTRAVENOUS
Status: DISCONTINUED | OUTPATIENT
Start: 2022-11-30 | End: 2022-11-30 | Stop reason: HOSPADM

## 2022-11-30 RX ORDER — SODIUM CHLORIDE 0.9 % (FLUSH) 0.9 %
3-10 SYRINGE (ML) INJECTION AS NEEDED
Status: DISCONTINUED | OUTPATIENT
Start: 2022-11-30 | End: 2022-11-30 | Stop reason: HOSPADM

## 2022-11-30 RX ORDER — ROCURONIUM BROMIDE 10 MG/ML
INJECTION, SOLUTION INTRAVENOUS AS NEEDED
Status: DISCONTINUED | OUTPATIENT
Start: 2022-11-30 | End: 2022-11-30 | Stop reason: SURG

## 2022-11-30 RX ORDER — FENTANYL CITRATE 50 UG/ML
50 INJECTION, SOLUTION INTRAMUSCULAR; INTRAVENOUS
Status: DISCONTINUED | OUTPATIENT
Start: 2022-11-30 | End: 2022-11-30 | Stop reason: HOSPADM

## 2022-11-30 RX ORDER — ACETAMINOPHEN 500 MG
1000 TABLET ORAL ONCE
Status: COMPLETED | OUTPATIENT
Start: 2022-11-30 | End: 2022-11-30

## 2022-11-30 RX ORDER — NEOSTIGMINE METHYLSULFATE 0.5 MG/ML
INJECTION, SOLUTION INTRAVENOUS AS NEEDED
Status: DISCONTINUED | OUTPATIENT
Start: 2022-11-30 | End: 2022-11-30 | Stop reason: SURG

## 2022-11-30 RX ORDER — LIDOCAINE HYDROCHLORIDE 10 MG/ML
0.5 INJECTION, SOLUTION EPIDURAL; INFILTRATION; INTRACAUDAL; PERINEURAL ONCE AS NEEDED
Status: DISCONTINUED | OUTPATIENT
Start: 2022-11-30 | End: 2022-11-30 | Stop reason: HOSPADM

## 2022-11-30 RX ORDER — PROMETHAZINE HYDROCHLORIDE 25 MG/1
25 SUPPOSITORY RECTAL ONCE AS NEEDED
Status: DISCONTINUED | OUTPATIENT
Start: 2022-11-30 | End: 2022-11-30 | Stop reason: HOSPADM

## 2022-11-30 RX ORDER — PROPOFOL 10 MG/ML
VIAL (ML) INTRAVENOUS AS NEEDED
Status: DISCONTINUED | OUTPATIENT
Start: 2022-11-30 | End: 2022-11-30 | Stop reason: SURG

## 2022-11-30 RX ORDER — BUPIVACAINE HYDROCHLORIDE 2.5 MG/ML
INJECTION, SOLUTION INFILTRATION; PERINEURAL AS NEEDED
Status: DISCONTINUED | OUTPATIENT
Start: 2022-11-30 | End: 2022-11-30 | Stop reason: HOSPADM

## 2022-11-30 RX ORDER — DIPHENHYDRAMINE HCL 25 MG
25 CAPSULE ORAL
Status: DISCONTINUED | OUTPATIENT
Start: 2022-11-30 | End: 2022-11-30 | Stop reason: HOSPADM

## 2022-11-30 RX ORDER — NALOXONE HCL 0.4 MG/ML
0.2 VIAL (ML) INJECTION AS NEEDED
Status: DISCONTINUED | OUTPATIENT
Start: 2022-11-30 | End: 2022-11-30 | Stop reason: HOSPADM

## 2022-11-30 RX ORDER — HYDROMORPHONE HYDROCHLORIDE 1 MG/ML
0.5 INJECTION, SOLUTION INTRAMUSCULAR; INTRAVENOUS; SUBCUTANEOUS
Status: DISCONTINUED | OUTPATIENT
Start: 2022-11-30 | End: 2022-11-30 | Stop reason: HOSPADM

## 2022-11-30 RX ORDER — PROMETHAZINE HYDROCHLORIDE 25 MG/1
25 TABLET ORAL ONCE AS NEEDED
Status: DISCONTINUED | OUTPATIENT
Start: 2022-11-30 | End: 2022-11-30 | Stop reason: HOSPADM

## 2022-11-30 RX ORDER — SODIUM CHLORIDE 9 MG/ML
INJECTION, SOLUTION INTRAVENOUS AS NEEDED
Status: DISCONTINUED | OUTPATIENT
Start: 2022-11-30 | End: 2022-11-30 | Stop reason: HOSPADM

## 2022-11-30 RX ORDER — HYDRALAZINE HYDROCHLORIDE 20 MG/ML
5 INJECTION INTRAMUSCULAR; INTRAVENOUS
Status: DISCONTINUED | OUTPATIENT
Start: 2022-11-30 | End: 2022-11-30 | Stop reason: HOSPADM

## 2022-11-30 RX ORDER — FLUMAZENIL 0.1 MG/ML
0.2 INJECTION INTRAVENOUS AS NEEDED
Status: DISCONTINUED | OUTPATIENT
Start: 2022-11-30 | End: 2022-11-30 | Stop reason: HOSPADM

## 2022-11-30 RX ORDER — DEXAMETHASONE SODIUM PHOSPHATE 4 MG/ML
INJECTION, SOLUTION INTRA-ARTICULAR; INTRALESIONAL; INTRAMUSCULAR; INTRAVENOUS; SOFT TISSUE AS NEEDED
Status: DISCONTINUED | OUTPATIENT
Start: 2022-11-30 | End: 2022-11-30 | Stop reason: SURG

## 2022-11-30 RX ORDER — GLYCOPYRROLATE 0.2 MG/ML
INJECTION INTRAMUSCULAR; INTRAVENOUS AS NEEDED
Status: DISCONTINUED | OUTPATIENT
Start: 2022-11-30 | End: 2022-11-30 | Stop reason: SURG

## 2022-11-30 RX ORDER — MIDAZOLAM HYDROCHLORIDE 1 MG/ML
1 INJECTION INTRAMUSCULAR; INTRAVENOUS
Status: DISCONTINUED | OUTPATIENT
Start: 2022-11-30 | End: 2022-11-30 | Stop reason: HOSPADM

## 2022-11-30 RX ORDER — HYDROCODONE BITARTRATE AND ACETAMINOPHEN 7.5; 325 MG/1; MG/1
1 TABLET ORAL ONCE AS NEEDED
Status: DISCONTINUED | OUTPATIENT
Start: 2022-11-30 | End: 2022-11-30 | Stop reason: HOSPADM

## 2022-11-30 RX ORDER — SODIUM CHLORIDE 0.9 % (FLUSH) 0.9 %
10 SYRINGE (ML) INJECTION EVERY 12 HOURS SCHEDULED
Status: DISCONTINUED | OUTPATIENT
Start: 2022-11-30 | End: 2022-11-30 | Stop reason: HOSPADM

## 2022-11-30 RX ORDER — SCOLOPAMINE TRANSDERMAL SYSTEM 1 MG/1
1 PATCH, EXTENDED RELEASE TRANSDERMAL CONTINUOUS
Status: DISCONTINUED | OUTPATIENT
Start: 2022-11-30 | End: 2022-11-30 | Stop reason: HOSPADM

## 2022-11-30 RX ORDER — OXYCODONE AND ACETAMINOPHEN 7.5; 325 MG/1; MG/1
1 TABLET ORAL EVERY 4 HOURS PRN
Status: DISCONTINUED | OUTPATIENT
Start: 2022-11-30 | End: 2022-11-30 | Stop reason: HOSPADM

## 2022-11-30 RX ORDER — ONDANSETRON 2 MG/ML
4 INJECTION INTRAMUSCULAR; INTRAVENOUS ONCE AS NEEDED
Status: DISCONTINUED | OUTPATIENT
Start: 2022-11-30 | End: 2022-11-30 | Stop reason: HOSPADM

## 2022-11-30 RX ORDER — SODIUM CHLORIDE, SODIUM LACTATE, POTASSIUM CHLORIDE, CALCIUM CHLORIDE 600; 310; 30; 20 MG/100ML; MG/100ML; MG/100ML; MG/100ML
9 INJECTION, SOLUTION INTRAVENOUS CONTINUOUS
Status: DISCONTINUED | OUTPATIENT
Start: 2022-11-30 | End: 2022-11-30 | Stop reason: HOSPADM

## 2022-11-30 RX ORDER — EPHEDRINE SULFATE 50 MG/ML
5 INJECTION, SOLUTION INTRAVENOUS ONCE AS NEEDED
Status: DISCONTINUED | OUTPATIENT
Start: 2022-11-30 | End: 2022-11-30 | Stop reason: HOSPADM

## 2022-11-30 RX ORDER — FAMOTIDINE 10 MG/ML
20 INJECTION, SOLUTION INTRAVENOUS ONCE
Status: COMPLETED | OUTPATIENT
Start: 2022-11-30 | End: 2022-11-30

## 2022-11-30 RX ADMIN — LIDOCAINE HYDROCHLORIDE 100 MG: 20 INJECTION, SOLUTION EPIDURAL; INFILTRATION; INTRACAUDAL; PERINEURAL at 10:20

## 2022-11-30 RX ADMIN — CLINDAMYCIN PHOSPHATE 900 MG: 900 INJECTION, SOLUTION INTRAVENOUS at 10:08

## 2022-11-30 RX ADMIN — ACETAMINOPHEN 1000 MG: 500 TABLET ORAL at 08:38

## 2022-11-30 RX ADMIN — SODIUM CHLORIDE, POTASSIUM CHLORIDE, SODIUM LACTATE AND CALCIUM CHLORIDE: 600; 310; 30; 20 INJECTION, SOLUTION INTRAVENOUS at 10:12

## 2022-11-30 RX ADMIN — SODIUM CHLORIDE, POTASSIUM CHLORIDE, SODIUM LACTATE AND CALCIUM CHLORIDE: 600; 310; 30; 20 INJECTION, SOLUTION INTRAVENOUS at 12:13

## 2022-11-30 RX ADMIN — SCOPALAMINE 1 PATCH: 1 PATCH, EXTENDED RELEASE TRANSDERMAL at 08:38

## 2022-11-30 RX ADMIN — NEOSTIGMINE METHYLSULFATE 4 MG: 0.5 INJECTION INTRAVENOUS at 11:54

## 2022-11-30 RX ADMIN — ONDANSETRON 4 MG: 2 INJECTION INTRAMUSCULAR; INTRAVENOUS at 11:54

## 2022-11-30 RX ADMIN — GENTAMICIN SULFATE 250 MG: 40 INJECTION, SOLUTION INTRAMUSCULAR; INTRAVENOUS at 10:08

## 2022-11-30 RX ADMIN — ROCURONIUM BROMIDE 50 MG: 10 INJECTION, SOLUTION INTRAVENOUS at 10:20

## 2022-11-30 RX ADMIN — PROPOFOL 200 MG: 10 INJECTION, EMULSION INTRAVENOUS at 10:20

## 2022-11-30 RX ADMIN — HYDROMORPHONE HYDROCHLORIDE 1 MG: 2 INJECTION, SOLUTION INTRAMUSCULAR; INTRAVENOUS; SUBCUTANEOUS at 10:53

## 2022-11-30 RX ADMIN — FAMOTIDINE 20 MG: 10 INJECTION INTRAVENOUS at 08:58

## 2022-11-30 RX ADMIN — HYDROMORPHONE HYDROCHLORIDE 1 MG: 2 INJECTION, SOLUTION INTRAMUSCULAR; INTRAVENOUS; SUBCUTANEOUS at 10:25

## 2022-11-30 RX ADMIN — DEXAMETHASONE SODIUM PHOSPHATE 8 MG: 4 INJECTION, SOLUTION INTRA-ARTICULAR; INTRALESIONAL; INTRAMUSCULAR; INTRAVENOUS; SOFT TISSUE at 10:52

## 2022-11-30 RX ADMIN — GLYCOPYRROLATE 0.4 MG: 1 INJECTION INTRAMUSCULAR; INTRAVENOUS at 11:54

## 2022-11-30 NOTE — ANESTHESIA POSTPROCEDURE EVALUATION
Patient: Patti Hogue    Procedure Summary     Date: 11/30/22 Room / Location: [facility] OR  / [facility] MAIN OR    Anesthesia Start: 1012 Anesthesia Stop: 1224    Procedures:       DILATATION AND CURETTAGE HYSTEROSCOPY NOVASURE ENDOMETRIAL ABLATION (Vagina)      SALPINGECTOMY LAPAROSCOPIC, INTRAUTERINE DEVICE REMOVAL (Bilateral: Abdomen) Diagnosis:       Menorrhagia with irregular cycle      (Menorrhagia with irregular cycle [N92.1])    Surgeons: Real Posada MD Provider: Zain Cornelius MD    Anesthesia Type: general ASA Status: 2          Anesthesia Type: general    Vitals  Vitals Value Taken Time   /74 11/30/22 1331   Temp 36.5 °C (97.7 °F) 11/30/22 1221   Pulse 67 11/30/22 1345   Resp 16 11/30/22 1330   SpO2 100 % 11/30/22 1345   Vitals shown include unvalidated device data.        Post Anesthesia Care and Evaluation    Patient location during evaluation: PACU  Patient participation: complete - patient participated  Level of consciousness: awake and alert  Pain management: adequate    Airway patency: patent  Anesthetic complications: No anesthetic complications    Cardiovascular status: acceptable  Respiratory status: acceptable  Hydration status: acceptable    Comments: --------------------            11/30/22               1330     --------------------   BP:       121/74     Pulse:      54       Resp:       16       Temp:                SpO2:      100%     --------------------

## 2022-11-30 NOTE — ANESTHESIA PROCEDURE NOTES
Airway  Urgency: elective    Date/Time: 11/30/2022 10:23 AM  End Time:11/30/2022 10:23 AM  Airway not difficult    General Information and Staff    Patient location during procedure: OR  Anesthesiologist: aZin Cornelius MD  CRNA/CAA: Dana Nur CRNA    Indications and Patient Condition  Indications for airway management: airway protection    Preoxygenated: yes  Mask difficulty assessment: 1 - vent by mask    Final Airway Details  Final airway type: endotracheal airway      Successful airway: ETT  Cuffed: yes   Successful intubation technique: direct laryngoscopy  Facilitating devices/methods: intubating stylet  Endotracheal tube insertion site: oral  Blade: Encarnacion  Blade size: 2  ETT size (mm): 7.0  Cormack-Lehane Classification: grade I - full view of glottis  Placement verified by: chest auscultation and capnometry   Cuff volume (mL): 7  Measured from: lips  ETT/EBT  to lips (cm): 20  Number of attempts at approach: 1  Assessment: lips, teeth, and gum same as pre-op and atraumatic intubation    Additional Comments  SIVI.  EYES TAPED CLOSED PRIOR TO DL.  INTUBATION AS CHARTED ABOVE.  APPEARS ATRAUMATIC.  NO CHANGE TO DENTITION. +ETCO2. +BBS. +CR.

## 2022-11-30 NOTE — ANESTHESIA PREPROCEDURE EVALUATION
Anesthesia Evaluation     Patient summary reviewed and Nursing notes reviewed                Airway   Mallampati: I  TM distance: >3 FB  Neck ROM: full  No difficulty expected  Dental      Pulmonary - negative pulmonary ROS   Cardiovascular - negative cardio ROS    Rhythm: regular  Rate: normal        Neuro/Psych  (+) headaches,    GI/Hepatic/Renal/Endo - negative ROS     Musculoskeletal (-) negative ROS    Abdominal    Substance History - negative use     OB/GYN negative ob/gyn ROS         Other      history of cancer                    Anesthesia Plan    ASA 2     general     ( Max present during anesthesia pre op    I have reviewed the patient's history with the patient and the chart, including all pertinent laboratory results and imaging. I have explained the risks of anesthesia including but not limited to dental damage, corneal abrasion, nerve injury, MI, stroke, and death. Questions asked and answered. Anesthetic plan discussed with patient and team as indicated. Patient expressed understanding of the above.  )  intravenous induction     Anesthetic plan, risks, benefits, and alternatives have been provided, discussed and informed consent has been obtained with: patient and spouse/significant other.        CODE STATUS:

## 2022-12-01 LAB
LAB AP CASE REPORT: NORMAL
PATH REPORT.FINAL DX SPEC: NORMAL
PATH REPORT.GROSS SPEC: NORMAL

## 2022-12-15 ENCOUNTER — OFFICE VISIT (OUTPATIENT)
Dept: OBSTETRICS AND GYNECOLOGY | Age: 47
End: 2022-12-15

## 2022-12-15 VITALS
BODY MASS INDEX: 19.31 KG/M2 | HEIGHT: 63 IN | WEIGHT: 109 LBS | SYSTOLIC BLOOD PRESSURE: 108 MMHG | DIASTOLIC BLOOD PRESSURE: 64 MMHG

## 2022-12-15 DIAGNOSIS — Z98.890 POST-OPERATIVE STATE: Primary | ICD-10-CM

## 2022-12-15 PROCEDURE — 99212 OFFICE O/P EST SF 10 MIN: CPT | Performed by: OBSTETRICS & GYNECOLOGY

## 2022-12-15 NOTE — PROGRESS NOTES
"Answers for HPI/ROS submitted by the patient on 2022  Please describe your symptoms.: Post op follow up…on the path to recovery. Still some cramping, bleeding & sensitivity to incision sites.  Have you had these symptoms before?: No  How long have you been having these symptoms?: 5-7 days  Please list any medications you are currently taking for this condition.: Ibuprofen  What is the primary reason for your visit?: Other    Subjective   Chief Complaint   Patient presents with   • Post-op     2WK POST OP DILATATION AND CURETTAGE HYSTEROSCOPY NOVASURE ENDOMETRIAL ABLATION SALPINGECTOMY LAPAROSCOPIC, INTRAUTERINE DEVICE REMOVAL           Patti Hogue is a 47 y.o. year old  presenting to be seen for her post-operative visit.  Currently she reports no problems with eating, bowel movements, voiding, or wound drainage.  Patient had a few sharp vaginal pains that resolved.  She also had some bloating that has gotten better.  She has had some light bleeding.  She also had a migraine that responded to Excedrin.    The pathology results from her procedure are in Patti's record and are benign.      OTHER THINGS SHE WANTS TO DISCUSS TODAY:  Nothing else    The following portions of the patient's history were reviewed and updated as appropriate:current medications and allergies       Objective   /64   Ht 160 cm (63\")   Wt 49.4 kg (109 lb)   BMI 19.31 kg/m²     General:  well developed; well nourished  no acute distress   Abdomen: soft, non-tender; no masses  icisions - CDI    Pelvis: Not performed.          Assessment   1. S/P endometrial ablation and bilateral partial salpingectomy     Plan   1. May return to full activity with no restrictions  2. The importance of keeping all planned follow-up and taking all medications as prescribed was emphasized.  3. Follow up for annual exam 1 year.    No orders of the defined types were placed in this encounter.             Note: Speech recognition transcription " software may have been used to create portions of this document.  An attempt at proofreading has been made but errors in transcription could still be present.

## 2023-01-03 ENCOUNTER — LAB (OUTPATIENT)
Dept: LAB | Facility: HOSPITAL | Age: 48
End: 2023-01-03
Payer: COMMERCIAL

## 2023-01-03 DIAGNOSIS — D50.0 IRON DEFICIENCY ANEMIA DUE TO CHRONIC BLOOD LOSS: ICD-10-CM

## 2023-01-03 LAB
BASOPHILS # BLD AUTO: 0.03 10*3/MM3 (ref 0–0.2)
BASOPHILS NFR BLD AUTO: 0.6 % (ref 0–1.5)
DEPRECATED RDW RBC AUTO: 47.1 FL (ref 37–54)
EOSINOPHIL # BLD AUTO: 0.17 10*3/MM3 (ref 0–0.4)
EOSINOPHIL NFR BLD AUTO: 3.7 % (ref 0.3–6.2)
ERYTHROCYTE [DISTWIDTH] IN BLOOD BY AUTOMATED COUNT: 13.2 % (ref 12.3–15.4)
FERRITIN SERPL-MCNC: 50.4 NG/ML (ref 11–207)
HCT VFR BLD AUTO: 38.1 % (ref 34–46.6)
HGB BLD-MCNC: 12.5 G/DL (ref 12–15.9)
IMM GRANULOCYTES # BLD AUTO: 0.01 10*3/MM3 (ref 0–0.05)
IMM GRANULOCYTES NFR BLD AUTO: 0.2 % (ref 0–0.5)
IRON 24H UR-MRATE: 258 MCG/DL (ref 37–145)
IRON SATN MFR SERPL: 59 % (ref 14–48)
LYMPHOCYTES # BLD AUTO: 1.34 10*3/MM3 (ref 0.7–3.1)
LYMPHOCYTES NFR BLD AUTO: 28.9 % (ref 19.6–45.3)
MCH RBC QN AUTO: 32.1 PG (ref 26.6–33)
MCHC RBC AUTO-ENTMCNC: 32.8 G/DL (ref 31.5–35.7)
MCV RBC AUTO: 97.7 FL (ref 79–97)
MONOCYTES # BLD AUTO: 0.33 10*3/MM3 (ref 0.1–0.9)
MONOCYTES NFR BLD AUTO: 7.1 % (ref 5–12)
NEUTROPHILS NFR BLD AUTO: 2.75 10*3/MM3 (ref 1.7–7)
NEUTROPHILS NFR BLD AUTO: 59.5 % (ref 42.7–76)
NRBC BLD AUTO-RTO: 0 /100 WBC (ref 0–0.2)
PLATELET # BLD AUTO: 201 10*3/MM3 (ref 140–450)
PMV BLD AUTO: 9 FL (ref 6–12)
RBC # BLD AUTO: 3.9 10*6/MM3 (ref 3.77–5.28)
TIBC SERPL-MCNC: 438 MCG/DL (ref 249–505)
TRANSFERRIN SERPL-MCNC: 313 MG/DL (ref 200–360)
WBC NRBC COR # BLD: 4.63 10*3/MM3 (ref 3.4–10.8)

## 2023-01-03 PROCEDURE — 84466 ASSAY OF TRANSFERRIN: CPT

## 2023-01-03 PROCEDURE — 85025 COMPLETE CBC W/AUTO DIFF WBC: CPT

## 2023-01-03 PROCEDURE — 82728 ASSAY OF FERRITIN: CPT

## 2023-01-03 PROCEDURE — 83540 ASSAY OF IRON: CPT

## 2023-01-03 PROCEDURE — 36415 COLL VENOUS BLD VENIPUNCTURE: CPT

## 2023-01-07 NOTE — PROGRESS NOTES
Subjective Generally feeling well, mildly tender postop    REASON FOR FOLLOWUP: History of DCIS left breast 2007                                  History of Present Illness   The patient is now a 47-year-old female seen in January 2022 by GI medicine presenting to [facility] care.  She has a known history of gestational diabetes, IBS diarrhea and been treated with Lotronex successfully.  She was seen by Dr. Blanco 1/18/2022 having moved to [place] in February of this year and with a history of IBS initially treated with Viberzi which was unsuccessful and the patient, thereafter, was treated successfully with Lotronex.  Plans were made for subsequent colonoscopy and, additionally, oncology recommendation was requested as result of a previous history of ductal carcinoma in situ.     Her records concerning this included review by Dr. Deepika Tejada of [facility] on 2/12/2020 with a history of DCIS (ER positive) status post lumpectomy and XRT in 2007 followed by tamoxifen x3 years discontinued 2010.  The patient describes genetic assessment for likely BRCA in 2007 which was negative.  The patient's been followed by yearly mammography and possibly every third year MRI.  She has been clinically stable without recurrence undergoing genetic assessment which was evidently unremarkable and also reviewed and treated for iron deficiency anemia.  Patient studies date from 2/12/2020 with ferritin 29.5, B12 906.5, vitamin D of 30.7, CMP with BUN/creatinine of 13.5 and 0.67, calcium 9.6, normal LFTs, iron of 115, TIBC of 3 1736% saturation, LDH of 142  The patient is initially seen 2/24/2022 at [facility] office and we discussed her history as well as periodic symptoms of left upper extremity tremor, recent ParaGard placement in 2018 for irregular menses, associated iron deficiency and need for additional genetic assessment with extended panel.    The patient subsequent testing included a normal  CBC, normal ferritin, iron profile with 10% saturation, CMP normal.  Diagnostic mammography are compared to 2016 along with ultrasound with no evidence in either modality of malignancy in either breast.    The patient underwent colonoscopy 3/21 with no significant abnormalities, biopsy of the descending colon revealed benign colonic mucosa, rectal biopsy was also negative.    The patient is seen 4/21/2022 with recent genetic assessment just drawn, plans for GYN assessment in the next several months and improvement in her IBS symptoms initially intolerant to Xifaxan but improved with Pamelor and Lotronex.  Her menses, ever, remain somewhat erratic even now.    The patient is next assessed 8/11/2022 with normalization of her CBC as well as iron stores.  Her genetics assessment has been completed with variant of unknown significance detected-NF1.  Patient states that her stamina has improved and though her menses are again erratic and often annoying that she feels relatively well.  She has gynecologic follow-up now scheduled.    This led to review by gynecology and the patient offered endometrial ablation as well as permanent sterilization with laparoscopic salpingectomy, IUD removal.  She underwent these procedures 11/30/2022.  Subsequent findings include benign proliferative endometrium, fragments consist of endometrial polyp, stromal breakdown, no hyperplasia.    Follow-up testing included CBC 1/3/2023 which has normalized with H&H 12.5 and 38.1, subsequent ferritin at 50.40, iron of 258, TIBC of 438 and 59% saturation.  She is feeling reasonably well except for mild tenderness abdominally postoperatively.      Past Medical History:   Diagnosis Date   • Breast cancer (HCC)    • Cancer (HCC)     breast cancer 2007 DCIS    LEFT   • Clotting disorder (HCC) 2017    Irregular & very heavy flow during menstral cycles. For 36 hours will have to use super plus tampons & change every 1.5 - 2 hours   • Gestational diabetes     • Heavy periods    • Hx of radiation therapy    • IBS (irritable bowel syndrome)     with diarrhea   • Iron deficiency    • IUD (intrauterine device) in place    • Low blood pressure    • Migraine 2007    On & off throughout the years. Typically once a quarter   • Ovarian cyst 1997    Had cyst rupture while in undergrad, went on birth control pills at that time   • Trigger finger of right thumb     s/p steroid injection with hand surgery   • Urinary tract infection Feb 2021    First UTI last year   • Varicella 1986    Had while in 5th grade        Past Surgical History:   Procedure Laterality Date   • BREAST BIOPSY  March 2007    Had DCIS in left breast with surgery to remove the mass. Followed by radiation & 3.5 years of Tamoxifen. Was tested for BRCA 1 & 2 in 2005 with a recent updated tested with Dr. Calderón   • BREAST LUMPECTOMY Left 2007   • CARDIAC CATHETERIZATION  2001    Table top test. Had issues with fainting. Low blood pressure & was on medication until pregnancy. Haven’t had issues since.   • COLONOSCOPY  approx 2011    negative per pt    • COLONOSCOPY N/A 03/21/2022    Procedure: COLONOSCOPY to cecum and TI with biopsies;  Surgeon: Tuan Blanco MD;  Location: [facility] ENDOSCOPY;  Service: Gastroenterology;  Laterality: N/A;  pre-change in bowel habits  post-hemorrhoids   • D & C HYSTEROSCOPY ENDOMETRIAL ABLATION N/A 11/30/2022    Procedure: DILATATION AND CURETTAGE HYSTEROSCOPY NOVASURE ENDOMETRIAL ABLATION;  Surgeon: Real Posada MD;  Location: [facility] OR;  Service: Obstetrics/Gynecology;  Laterality: N/A;   • DIAGNOSTIC LAPAROSCOPY Bilateral 11/30/2022    Procedure: SALPINGECTOMY LAPAROSCOPIC, INTRAUTERINE DEVICE REMOVAL;  Surgeon: Real Posada MD;  Location: [facility] OR;  Service: Obstetrics/Gynecology;  Laterality: Bilateral;   • TUMOR REMOVAL      right foot during childhood   • WISDOM TOOTH EXTRACTION  1996    During undergrad        Current Outpatient Medications on File Prior to  Visit   Medication Sig Dispense Refill   • alosetron (LOTRONEX) 0.5 MG tablet Take 1 tablet by mouth 2 (Two) Times a Day. 180 tablet 3   • multivitamin with minerals tablet tablet Take 1 tablet by mouth Daily.     • nortriptyline (PAMELOR) 10 MG capsule Take 1 capsule by mouth Every Night. 30 capsule 11     No current facility-administered medications on file prior to visit.        ALLERGIES:    Allergies   Allergen Reactions   • Penicillins Hives        Social History     Socioeconomic History   • Marital status:      Spouse name: Max   • Number of children: 2   • Years of education: College   Tobacco Use   • Smoking status: Never   • Smokeless tobacco: Never   • Tobacco comments:     social in college    Vaping Use   • Vaping Use: Never used   Substance and Sexual Activity   • Alcohol use: Yes     Alcohol/week: 7.0 standard drinks     Types: 7 Glasses of wine per week     Comment: On average, glass of wine a day   • Drug use: Never   • Sexual activity: Yes     Partners: Male     Birth control/protection: I.U.D.        Family History   Adopted: Yes   Problem Relation Age of Onset   • Malig Hyperthermia Neg Hx         Review of Systems   Constitutional: Negative.    HENT: Negative.    Eyes: Negative.    Respiratory: Negative.    Cardiovascular: Negative.    Gastrointestinal: Diarrhea: See history of present illness.   Endocrine: Negative.    Genitourinary: Positive for menstrual problem.   Musculoskeletal: Negative.    Skin: Negative.    Allergic/Immunologic: Negative.    Neurological: Positive for tremors (Left upper extremity, episodic).   Hematological: Negative.    Psychiatric/Behavioral: Negative.        Objective     Vitals:    01/09/23 0924   BP: 110/70   Pulse: 70   Resp: 18   Temp: 97.2 °F (36.2 °C)   TempSrc: Temporal   SpO2: 100%   Weight: 50 kg (110 lb 4.8 oz)   Height: 160 cm (62.99\")   PainSc: 0-No pain     Current Status 1/9/2023   ECOG score 0       Physical Exam  Constitutional:        Appearance: Normal appearance. She is normal weight.   HENT:      Head: Normocephalic and atraumatic.      Nose: Nose normal.      Mouth/Throat:      Mouth: Mucous membranes are moist.      Pharynx: Oropharynx is clear.   Eyes:      Extraocular Movements: Extraocular movements intact.      Conjunctiva/sclera: Conjunctivae normal.      Pupils: Pupils are equal, round, and reactive to light.   Cardiovascular:      Rate and Rhythm: Normal rate and regular rhythm.      Pulses: Normal pulses.      Heart sounds: Normal heart sounds.   Pulmonary:      Effort: Pulmonary effort is normal.      Breath sounds: Normal breath sounds.      Comments: Patient status post left breast lumpectomy, well-healed, radiation tattoos recognized, no additional abnormalities  Abdominal:      General: Bowel sounds are normal.      Palpations: Abdomen is soft.      Comments: Laparoscopic sites, healing   Musculoskeletal:         General: Normal range of motion.      Cervical back: Normal range of motion and neck supple.   Skin:     General: Skin is warm and dry.   Neurological:      General: No focal deficit present.      Mental Status: She is alert and oriented to person, place, and time.   Psychiatric:         Mood and Affect: Mood normal.         Behavior: Behavior normal.           RECENT LABS:  Hematology WBC   Date Value Ref Range Status   01/03/2023 4.63 3.40 - 10.80 10*3/mm3 Final     RBC   Date Value Ref Range Status   01/03/2023 3.90 3.77 - 5.28 10*6/mm3 Final     Hemoglobin   Date Value Ref Range Status   01/03/2023 12.5 12.0 - 15.9 g/dL Final     Hematocrit   Date Value Ref Range Status   01/03/2023 38.1 34.0 - 46.6 % Final     Platelets   Date Value Ref Range Status   01/03/2023 201 140 - 450 10*3/mm3 Final          Assessment & Plan     47-year-old female with history of:    *DCIS left breast 2007 status post lumpectomy, radiation therapy and 3 years of tamoxifen.  Patient describes the tumor was ER positive but is unclear about  additional testing.  · She was seen in consultation 2/24/2022.  ·  subsequent testing included a normal CBC, normal ferritin, iron profile with 10% saturation, CMP normal.  Diagnostic mammography are compared to 2016 along with ultrasound with no evidence in either modality of malignancy in either breast.  · Genetic assessment just initiated 4/21/2022 with results- Variant of unknown significance detected-NF1  · Plan screening mammography late March, early April 2023    *IBS  · The patient underwent colonoscopy 3/21 with no significant abnormalities, biopsy of the descending colon revealed benign colonic mucosa, rectal biopsy was also negative.  · Recent improvement on Pamelor Lotronex    *Erratic menses  · Gynecologic consultation-subsequent endometrial ablation as well as permanent sterilization with laparoscopic salpingectomy, IUD removal.  She underwent these procedures 11/30/2022.  Subsequent findings include benign proliferative endometrium, fragments consist of endometrial polyp, stromal breakdown, no hyperplasia.      *Iron deficiency anemia  · Placed on oral iron after visit 2/24/2022  · Reassessment of iron status 8/5/2022 finds normal iron profile, ferritin and CBC, oral iron discontinued  · Follow-up testing included CBC 1/3/2023 which has normalized with H&H 12.5 and 38.1, subsequent ferritin at 50.40, iron of 258, TIBC of 438 and 59% saturation      Plan:  *Discontinue oral iron    *Subsequent mammography late March, early April 2023    *51 weeks repeat studies for iron status CBC    *52 weeks MD    *Gynecologic follow-up as needed.

## 2023-01-09 ENCOUNTER — APPOINTMENT (OUTPATIENT)
Dept: LAB | Facility: HOSPITAL | Age: 48
End: 2023-01-09
Payer: COMMERCIAL

## 2023-01-09 ENCOUNTER — OFFICE VISIT (OUTPATIENT)
Dept: ONCOLOGY | Facility: CLINIC | Age: 48
End: 2023-01-09
Payer: COMMERCIAL

## 2023-01-09 VITALS
HEART RATE: 70 BPM | WEIGHT: 110.3 LBS | SYSTOLIC BLOOD PRESSURE: 110 MMHG | DIASTOLIC BLOOD PRESSURE: 70 MMHG | HEIGHT: 63 IN | OXYGEN SATURATION: 100 % | BODY MASS INDEX: 19.54 KG/M2 | RESPIRATION RATE: 18 BRPM | TEMPERATURE: 97.2 F

## 2023-01-09 DIAGNOSIS — D05.12 BREAST NEOPLASM, TIS (DCIS), LEFT: Primary | ICD-10-CM

## 2023-01-09 DIAGNOSIS — Z86.000 HISTORY OF DUCTAL CARCINOMA IN SITU (DCIS) OF BREAST: ICD-10-CM

## 2023-01-09 PROCEDURE — 99214 OFFICE O/P EST MOD 30 MIN: CPT | Performed by: INTERNAL MEDICINE

## 2023-01-09 NOTE — LETTER
January 9, 2023     Chente Mathews DO  [address]  [place] [id number]    Patient: Patti Hogue   YOB: 1975   Date of Visit: 1/9/2023       Dear Dr. Reid DO:    Thank you for referring Ptati Hogue to me for evaluation. Below are the relevant portions of my assessment and plan of care.    If you have questions, please do not hesitate to call me. I look forward to following Patti along with you.         Sincerely,        Benny Calderón MD        CC: MD Kaitlynn Meneses [place] D., MD  01/09/23 1021  Signed    Subjective Generally feeling well, mildly tender postop    REASON FOR FOLLOWUP: History of DCIS left breast 2007                                  History of Present Illness   The patient is now a 47-year-old female seen in January 2022 by GI medicine presenting to [facility] care.  She has a known history of gestational diabetes, IBS diarrhea and been treated with Lotronex successfully.  She was seen by Dr. Blanco 1/18/2022 having moved to [place] in February of this year and with a history of IBS initially treated with Viberzi which was unsuccessful and the patient, thereafter, was treated successfully with Lotronex.  Plans were made for subsequent colonoscopy and, additionally, oncology recommendation was requested as result of a previous history of ductal carcinoma in situ.     Her records concerning this included review by Dr. Deepika Tejada of [facility] on 2/12/2020 with a history of DCIS (ER positive) status post lumpectomy and XRT in 2007 followed by tamoxifen x3 years discontinued 2010.  The patient describes genetic assessment for likely BRCA in 2007 which was negative.  The patient's been followed by yearly mammography and possibly every third year MRI.  She has been clinically stable without recurrence undergoing genetic assessment which was evidently unremarkable and also reviewed and treated for iron deficiency  anemia.  Patient studies date from 2/12/2020 with ferritin 29.5, B12 906.5, vitamin D of 30.7, CMP with BUN/creatinine of 13.5 and 0.67, calcium 9.6, normal LFTs, iron of 115, TIBC of 3 1736% saturation, LDH of 142  The patient is initially seen 2/24/2022 at [facility] office and we discussed her history as well as periodic symptoms of left upper extremity tremor, recent ParaGard placement in 2018 for irregular menses, associated iron deficiency and need for additional genetic assessment with extended panel.    The patient subsequent testing included a normal CBC, normal ferritin, iron profile with 10% saturation, CMP normal.  Diagnostic mammography are compared to 2016 along with ultrasound with no evidence in either modality of malignancy in either breast.    The patient underwent colonoscopy 3/21 with no significant abnormalities, biopsy of the descending colon revealed benign colonic mucosa, rectal biopsy was also negative.    The patient is seen 4/21/2022 with recent genetic assessment just drawn, plans for GYN assessment in the next several months and improvement in her IBS symptoms initially intolerant to Xifaxan but improved with Pamelor and Lotronex.  Her menses, ever, remain somewhat erratic even now.    The patient is next assessed 8/11/2022 with normalization of her CBC as well as iron stores.  Her genetics assessment has been completed with variant of unknown significance detected-NF1.  Patient states that her stamina has improved and though her menses are again erratic and often annoying that she feels relatively well.  She has gynecologic follow-up now scheduled.    This led to review by gynecology and the patient offered endometrial ablation as well as permanent sterilization with laparoscopic salpingectomy, IUD removal.  She underwent these procedures 11/30/2022.  Subsequent findings include benign proliferative endometrium, fragments consist of endometrial polyp, stromal breakdown, no  hyperplasia.    Follow-up testing included CBC 1/3/2023 which has normalized with H&H 12.5 and 38.1, subsequent ferritin at 50.40, iron of 258, TIBC of 438 and 59% saturation.  She is feeling reasonably well except for mild tenderness abdominally postoperatively.      Past Medical History:   Diagnosis Date   • Breast cancer (HCC)    • Cancer (HCC)     breast cancer 2007 DCIS    LEFT   • Clotting disorder (HCC) 2017    Irregular & very heavy flow during menstral cycles. For 36 hours will have to use super plus tampons & change every 1.5 - 2 hours   • Gestational diabetes    • Heavy periods    • Hx of radiation therapy    • IBS (irritable bowel syndrome)     with diarrhea   • Iron deficiency    • IUD (intrauterine device) in place    • Low blood pressure    • Migraine 2007    On & off throughout the years. Typically once a quarter   • Ovarian cyst 1997    Had cyst rupture while in undergrad, went on birth control pills at that time   • Trigger finger of right thumb     s/p steroid injection with hand surgery   • Urinary tract infection Feb 2021    First UTI last year   • Varicella 1986    Had while in 5th grade        Past Surgical History:   Procedure Laterality Date   • BREAST BIOPSY  March 2007    Had DCIS in left breast with surgery to remove the mass. Followed by radiation & 3.5 years of Tamoxifen. Was tested for BRCA 1 & 2 in 2005 with a recent updated tested with Dr. Calderón   • BREAST LUMPECTOMY Left 2007   • CARDIAC CATHETERIZATION  2001    Table top test. Had issues with fainting. Low blood pressure & was on medication until pregnancy. Haven’t had issues since.   • COLONOSCOPY  approx 2011    negative per pt    • COLONOSCOPY N/A 03/21/2022    Procedure: COLONOSCOPY to cecum and TI with biopsies;  Surgeon: Tuan Blanco MD;  Location: [facility] ENDOSCOPY;  Service: Gastroenterology;  Laterality: N/A;  pre-change in bowel habits  post-hemorrhoids   • D & C HYSTEROSCOPY ENDOMETRIAL ABLATION N/A 11/30/2022     Procedure: DILATATION AND CURETTAGE HYSTEROSCOPY NOVASURE ENDOMETRIAL ABLATION;  Surgeon: Real Posada MD;  Location: [facility] OR;  Service: Obstetrics/Gynecology;  Laterality: N/A;   • DIAGNOSTIC LAPAROSCOPY Bilateral 11/30/2022    Procedure: SALPINGECTOMY LAPAROSCOPIC, INTRAUTERINE DEVICE REMOVAL;  Surgeon: Real Posada MD;  Location: [facility] OR;  Service: Obstetrics/Gynecology;  Laterality: Bilateral;   • TUMOR REMOVAL      right foot during childhood   • WISDOM TOOTH EXTRACTION  1996    During undergrad        Current Outpatient Medications on File Prior to Visit   Medication Sig Dispense Refill   • alosetron (LOTRONEX) 0.5 MG tablet Take 1 tablet by mouth 2 (Two) Times a Day. 180 tablet 3   • multivitamin with minerals tablet tablet Take 1 tablet by mouth Daily.     • nortriptyline (PAMELOR) 10 MG capsule Take 1 capsule by mouth Every Night. 30 capsule 11     No current facility-administered medications on file prior to visit.        ALLERGIES:    Allergies   Allergen Reactions   • Penicillins Hives        Social History     Socioeconomic History   • Marital status:      Spouse name: Max   • Number of children: 2   • Years of education: College   Tobacco Use   • Smoking status: Never   • Smokeless tobacco: Never   • Tobacco comments:     social in college    Vaping Use   • Vaping Use: Never used   Substance and Sexual Activity   • Alcohol use: Yes     Alcohol/week: 7.0 standard drinks     Types: 7 Glasses of wine per week     Comment: On average, glass of wine a day   • Drug use: Never   • Sexual activity: Yes     Partners: Male     Birth control/protection: I.U.D.        Family History   Adopted: Yes   Problem Relation Age of Onset   • Malig Hyperthermia Neg Hx         Review of Systems   Constitutional: Negative.    HENT: Negative.    Eyes: Negative.    Respiratory: Negative.    Cardiovascular: Negative.    Gastrointestinal: Diarrhea: See history of present illness.   Endocrine: Negative.     Genitourinary: Positive for menstrual problem.   Musculoskeletal: Negative.    Skin: Negative.    Allergic/Immunologic: Negative.    Neurological: Positive for tremors (Left upper extremity, episodic).   Hematological: Negative.    Psychiatric/Behavioral: Negative.        Objective     Vitals:    01/09/23 0924   BP: 110/70   Pulse: 70   Resp: 18   Temp: 97.2 °F (36.2 °C)   TempSrc: Temporal   SpO2: 100%   Weight: 50 kg (110 lb 4.8 oz)   Height: 160 cm (62.99\")   PainSc: 0-No pain     Current Status 1/9/2023   ECOG score 0       Physical Exam  Constitutional:       Appearance: Normal appearance. She is normal weight.   HENT:      Head: Normocephalic and atraumatic.      Nose: Nose normal.      Mouth/Throat:      Mouth: Mucous membranes are moist.      Pharynx: Oropharynx is clear.   Eyes:      Extraocular Movements: Extraocular movements intact.      Conjunctiva/sclera: Conjunctivae normal.      Pupils: Pupils are equal, round, and reactive to light.   Cardiovascular:      Rate and Rhythm: Normal rate and regular rhythm.      Pulses: Normal pulses.      Heart sounds: Normal heart sounds.   Pulmonary:      Effort: Pulmonary effort is normal.      Breath sounds: Normal breath sounds.      Comments: Patient status post left breast lumpectomy, well-healed, radiation tattoos recognized, no additional abnormalities  Abdominal:      General: Bowel sounds are normal.      Palpations: Abdomen is soft.      Comments: Laparoscopic sites, healing   Musculoskeletal:         General: Normal range of motion.      Cervical back: Normal range of motion and neck supple.   Skin:     General: Skin is warm and dry.   Neurological:      General: No focal deficit present.      Mental Status: She is alert and oriented to person, place, and time.   Psychiatric:         Mood and Affect: Mood normal.         Behavior: Behavior normal.           RECENT LABS:  Hematology WBC   Date Value Ref Range Status   01/03/2023 4.63 3.40 - 10.80 10*3/mm3  Final     RBC   Date Value Ref Range Status   01/03/2023 3.90 3.77 - 5.28 10*6/mm3 Final     Hemoglobin   Date Value Ref Range Status   01/03/2023 12.5 12.0 - 15.9 g/dL Final     Hematocrit   Date Value Ref Range Status   01/03/2023 38.1 34.0 - 46.6 % Final     Platelets   Date Value Ref Range Status   01/03/2023 201 140 - 450 10*3/mm3 Final          Assessment & Plan     47-year-old female with history of:    *DCIS left breast 2007 status post lumpectomy, radiation therapy and 3 years of tamoxifen.  Patient describes the tumor was ER positive but is unclear about additional testing.  · She was seen in consultation 2/24/2022.  ·  subsequent testing included a normal CBC, normal ferritin, iron profile with 10% saturation, CMP normal.  Diagnostic mammography are compared to 2016 along with ultrasound with no evidence in either modality of malignancy in either breast.  · Genetic assessment just initiated 4/21/2022 with results- Variant of unknown significance detected-NF1  · Plan screening mammography late March, early April 2023    *IBS  · The patient underwent colonoscopy 3/21 with no significant abnormalities, biopsy of the descending colon revealed benign colonic mucosa, rectal biopsy was also negative.  · Recent improvement on Pamelor Lotronex    *Erratic menses  · Gynecologic consultation-subsequent endometrial ablation as well as permanent sterilization with laparoscopic salpingectomy, IUD removal.  She underwent these procedures 11/30/2022.  Subsequent findings include benign proliferative endometrium, fragments consist of endometrial polyp, stromal breakdown, no hyperplasia.      *Iron deficiency anemia  · Placed on oral iron after visit 2/24/2022  · Reassessment of iron status 8/5/2022 finds normal iron profile, ferritin and CBC, oral iron discontinued  · Follow-up testing included CBC 1/3/2023 which has normalized with H&H 12.5 and 38.1, subsequent ferritin at 50.40, iron of 258, TIBC of 438 and 59%  saturation      Plan:  *Discontinue oral iron    *Subsequent mammography late March, early April 2023    *51 weeks repeat studies for iron status CBC    *52 weeks MD    *Gynecologic follow-up as needed.

## 2023-03-01 RX ORDER — FERROUS SULFATE 325(65) MG
TABLET ORAL
Qty: 90 TABLET | Refills: 1 | OUTPATIENT
Start: 2023-03-01

## 2023-03-28 RX ORDER — ALOSETRON HYDROCHLORIDE 0.5 MG/1
TABLET, FILM COATED ORAL
Qty: 180 TABLET | Refills: 3 | Status: SHIPPED | OUTPATIENT
Start: 2023-03-28

## 2023-04-03 RX ORDER — NORTRIPTYLINE HYDROCHLORIDE 10 MG/1
CAPSULE ORAL
Qty: 30 CAPSULE | Refills: 11 | Status: SHIPPED | OUTPATIENT
Start: 2023-04-03

## 2023-04-10 ENCOUNTER — HOSPITAL ENCOUNTER (OUTPATIENT)
Dept: MAMMOGRAPHY | Facility: HOSPITAL | Age: 48
Discharge: HOME | End: 2023-04-10
Admitting: INTERNAL MEDICINE
Payer: COMMERCIAL

## 2023-04-10 DIAGNOSIS — Z86.000 HISTORY OF DUCTAL CARCINOMA IN SITU (DCIS) OF BREAST: ICD-10-CM

## 2023-04-10 DIAGNOSIS — D05.12 BREAST NEOPLASM, TIS (DCIS), LEFT: ICD-10-CM

## 2023-04-10 PROCEDURE — 77063 BREAST TOMOSYNTHESIS BI: CPT

## 2023-04-10 PROCEDURE — 77067 SCR MAMMO BI INCL CAD: CPT

## 2023-05-18 ENCOUNTER — OFFICE VISIT (OUTPATIENT)
Dept: GASTROENTEROLOGY | Facility: CLINIC | Age: 48
End: 2023-05-18
Payer: COMMERCIAL

## 2023-05-18 VITALS
SYSTOLIC BLOOD PRESSURE: 101 MMHG | HEIGHT: 64 IN | TEMPERATURE: 98.9 F | HEART RATE: 76 BPM | OXYGEN SATURATION: 96 % | DIASTOLIC BLOOD PRESSURE: 69 MMHG | WEIGHT: 109 LBS | BODY MASS INDEX: 18.61 KG/M2

## 2023-05-18 DIAGNOSIS — K58.0 IRRITABLE BOWEL SYNDROME WITH DIARRHEA: Primary | ICD-10-CM

## 2023-05-18 PROCEDURE — 99212 OFFICE O/P EST SF 10 MIN: CPT | Performed by: INTERNAL MEDICINE

## 2023-05-18 RX ORDER — ALOSETRON HYDROCHLORIDE 0.5 MG/1
0.5 TABLET, FILM COATED ORAL 2 TIMES DAILY
Qty: 180 TABLET | Refills: 3 | Status: SHIPPED | OUTPATIENT
Start: 2023-05-18

## 2023-05-18 RX ORDER — NORTRIPTYLINE HYDROCHLORIDE 10 MG/1
10 CAPSULE ORAL NIGHTLY
Qty: 90 CAPSULE | Refills: 3 | Status: SHIPPED | OUTPATIENT
Start: 2023-05-18

## 2023-05-18 NOTE — PROGRESS NOTES
Chief Complaint   Patient presents with   • Irritable Bowel Syndrome       Patti Hogue is a  47 y.o. female here for a follow up visit for IBS diarrhea.    HPI     Patient 47-year-old female with history of IBS diarrhea as well as a history of breast cancer who is done exceptionally well on her current therapy.  Patient is due for follow-up for med refill here without complaint.    Past Medical History:   Diagnosis Date   • Breast cancer    • Cancer     breast cancer 2007 DCIS    LEFT   • Clotting disorder 2017    Irregular & very heavy flow during menstral cycles. For 36 hours will have to use super plus tampons & change every 1.5 - 2 hours   • Gestational diabetes    • Heavy periods    • Hx of radiation therapy    • IBS (irritable bowel syndrome)     with diarrhea   • Iron deficiency    • IUD (intrauterine device) in place    • Low blood pressure    • Migraine 2007    On & off throughout the years. Typically once a quarter   • Ovarian cyst 1997    Had cyst rupture while in [facility], went on birth control pills at that time   • Trigger finger of right thumb     s/p steroid injection with hand surgery   • Urinary tract infection Feb 2021    First UTI last year   • Varicella 1986    Had while in 5th grade         Current Outpatient Medications:   •  alosetron (LOTRONEX) 0.5 MG tablet, Take 1 tablet by mouth 2 (Two) Times a Day., Disp: 180 tablet, Rfl: 3  •  multivitamin with minerals tablet tablet, Take 1 tablet by mouth Daily., Disp: , Rfl:   •  nortriptyline (PAMELOR) 10 MG capsule, Take 1 capsule by mouth Every Night., Disp: 90 capsule, Rfl: 3    Allergies   Allergen Reactions   • Penicillins Hives       Social History     Socioeconomic History   • Marital status:      Spouse name: Max   • Number of children: 2   • Years of education: College   Tobacco Use   • Smoking status: Never   • Smokeless tobacco: Never   • Tobacco comments:     social in college    Vaping Use   • Vaping Use: Never used    Substance and Sexual Activity   • Alcohol use: Yes     Alcohol/week: 7.0 standard drinks     Types: 7 Glasses of wine per week     Comment: On average, glass of wine a day   • Drug use: Never   • Sexual activity: Yes     Partners: Male     Birth control/protection: Tubal ligation       Family History   Adopted: Yes   Problem Relation Age of Onset   • Malig Hyperthermia Neg Hx        Review of Systems   Constitutional: Negative.    HENT: Negative.    Eyes: Negative.    Respiratory: Negative.    Cardiovascular: Negative.    Musculoskeletal: Negative.    Skin: Negative.    Hematological: Negative.        Vitals:    05/18/23 1151   BP: 101/69   Pulse: 76   Temp: 98.9 °F (37.2 °C)   SpO2: 96%       Physical Exam  Vitals reviewed.   Constitutional:       Appearance: Normal appearance. She is well-developed and normal weight.   HENT:      Head: Normocephalic and atraumatic.      Mouth/Throat:      Mouth: Mucous membranes are moist.      Pharynx: Oropharynx is clear.   Eyes:      General: No scleral icterus.     Pupils: Pupils are equal, round, and reactive to light.   Cardiovascular:      Rate and Rhythm: Normal rate and regular rhythm.      Heart sounds: Normal heart sounds.   Pulmonary:      Effort: Pulmonary effort is normal. No respiratory distress.      Breath sounds: Normal breath sounds.   Abdominal:      General: Bowel sounds are normal. There is no distension.      Palpations: Abdomen is soft.      Tenderness: There is no abdominal tenderness.   Skin:     General: Skin is warm and dry.      Coloration: Skin is not jaundiced.      Findings: No rash.   Neurological:      General: No focal deficit present.      Mental Status: She is alert and oriented to person, place, and time.      Cranial Nerves: No cranial nerve deficit.   Psychiatric:         Mood and Affect: Mood normal.         Behavior: Behavior normal.         Thought Content: Thought content normal.         No visits with results within 2 Month(s) from  this visit.   Latest known visit with results is:   Lab on 01/03/2023   Component Date Value Ref Range Status   • Ferritin 01/03/2023 50.40  11.00 - 207.00 ng/mL Final   • Iron 01/03/2023 258 (H)  37 - 145 mcg/dL Final   • Iron Saturation 01/03/2023 59 (H)  14 - 48 % Final   • Transferrin 01/03/2023 313  200 - 360 mg/dL Final   • TIBC 01/03/2023 438  249 - 505 mcg/dL Final   • WBC 01/03/2023 4.63  3.40 - 10.80 10*3/mm3 Final   • RBC 01/03/2023 3.90  3.77 - 5.28 10*6/mm3 Final   • Hemoglobin 01/03/2023 12.5  12.0 - 15.9 g/dL Final   • Hematocrit 01/03/2023 38.1  34.0 - 46.6 % Final   • MCV 01/03/2023 97.7 (H)  79.0 - 97.0 fL Final   • MCH 01/03/2023 32.1  26.6 - 33.0 pg Final   • MCHC 01/03/2023 32.8  31.5 - 35.7 g/dL Final   • RDW 01/03/2023 13.2  12.3 - 15.4 % Final   • RDW-SD 01/03/2023 47.1  37.0 - 54.0 fl Final   • MPV 01/03/2023 9.0  6.0 - 12.0 fL Final   • Platelets 01/03/2023 201  140 - 450 10*3/mm3 Final   • Neutrophil % 01/03/2023 59.5  42.7 - 76.0 % Final   • Lymphocyte % 01/03/2023 28.9  19.6 - 45.3 % Final   • Monocyte % 01/03/2023 7.1  5.0 - 12.0 % Final   • Eosinophil % 01/03/2023 3.7  0.3 - 6.2 % Final   • Basophil % 01/03/2023 0.6  0.0 - 1.5 % Final   • Immature Grans % 01/03/2023 0.2  0.0 - 0.5 % Final   • Neutrophils, Absolute 01/03/2023 2.75  1.70 - 7.00 10*3/mm3 Final   • Lymphocytes, Absolute 01/03/2023 1.34  0.70 - 3.10 10*3/mm3 Final   • Monocytes, Absolute 01/03/2023 0.33  0.10 - 0.90 10*3/mm3 Final   • Eosinophils, Absolute 01/03/2023 0.17  0.00 - 0.40 10*3/mm3 Final   • Basophils, Absolute 01/03/2023 0.03  0.00 - 0.20 10*3/mm3 Final   • Immature Grans, Absolute 01/03/2023 0.01  0.00 - 0.05 10*3/mm3 Final   • nRBC 01/03/2023 0.0  0.0 - 0.2 /100 WBC Final       Diagnoses and all orders for this visit:    1. Irritable bowel syndrome with diarrhea (Primary)    Other orders  -     alosetron (LOTRONEX) 0.5 MG tablet; Take 1 tablet by mouth 2 (Two) Times a Day.  Dispense: 180 tablet; Refill: 3  -      nortriptyline (PAMELOR) 10 MG capsule; Take 1 capsule by mouth Every Night.  Dispense: 90 capsule; Refill: 3      Patient 47-year-old female with history of IBS with history of difficulty controlling her IBS symptoms had been on Lotronex in the past but was having breakthrough at last visit now with complete resolution of her symptoms taking the Lotronex 0.5 twice daily with 10 mg of Pamelor at bedtime.  Review of symptoms are completely within normal limits will refill her medication and follow-up in 1 year

## 2023-08-07 ENCOUNTER — OFFICE VISIT (OUTPATIENT)
Dept: INTERNAL MEDICINE | Facility: CLINIC | Age: 48
End: 2023-08-07
Payer: COMMERCIAL

## 2023-08-07 VITALS
WEIGHT: 113 LBS | TEMPERATURE: 97.8 F | HEIGHT: 64 IN | OXYGEN SATURATION: 99 % | HEART RATE: 113 BPM | BODY MASS INDEX: 19.29 KG/M2 | SYSTOLIC BLOOD PRESSURE: 110 MMHG | DIASTOLIC BLOOD PRESSURE: 66 MMHG

## 2023-08-07 DIAGNOSIS — Z00.00 ANNUAL PHYSICAL EXAM: Primary | ICD-10-CM

## 2023-08-07 DIAGNOSIS — Z13.220 SCREENING FOR HYPERLIPIDEMIA: ICD-10-CM

## 2023-08-07 DIAGNOSIS — R25.1 TREMOR: ICD-10-CM

## 2023-08-07 PROCEDURE — 99396 PREV VISIT EST AGE 40-64: CPT | Performed by: STUDENT IN AN ORGANIZED HEALTH CARE EDUCATION/TRAINING PROGRAM

## 2023-08-07 NOTE — PROGRESS NOTES
"  Chente Mathews D.O.  Internal Medicine  [facility]  [address], [address]  [place] [address]  [phone]    Chief Complaint  Annual checkup    HISTORY    Patti Hogue is a 47 y.o. female who presents to the office today as a  an established patient for their annual preventative exam.     No hospitalization(s) within the last year.     Current exercise regimen: not regularly     Status of chronic medical conditions:    IBS-D: follows with [facility], had normal colonoscopy 3/2022. Currently being treated with Lotronex 0.5 mg twice daily (which has been since 2011) and nortriptyline 10 mg in the evening with great improvement in symptoms.      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      Iron deficiency: has followed with [facility] for this as well and previously on iron replacement. No longer requiring that per her report.     Patient's overall comments about their health or any other specific health concerns they report:    Shaking of the left hand: notices it more at the end of the day such as when she is at home after work spending time with her family such as when it is placed at rest. She was carrying some plates with her left hand this weekend and the plates were slightly shaky because of the movement. Overall states it is \"super minor\". Going on since [id number] and seems like it is more noticeable. Probably happens every several weeks. States she doesn't know any family history.     GYN History:     *Patient's GYN Practice: [facility]    *irregular periods    *tubal ligation    *Previous pregnancies: 3.  Live births: 2.  Miscarriages: 1 . Elective abortions: 0.      Health Maintenance Summary            Overdue - COVID-19 Vaccine (4 - Moderna series) Overdue since 1/22/2022 11/27/2021  Imm Admin: COVID-19 (MODERNA) 1st, 2nd, 3rd Dose Only    " 04/14/2021  Imm Admin: COVID-19 (MODERNA) 1st, 2nd, 3rd Dose Only    03/17/2021  Imm Admin: COVID-19 (MODERNA) 1st, 2nd, 3rd Dose Only              Overdue - ANNUAL PHYSICAL (Yearly) Overdue since 7/7/2023 07/07/2022  Done              INFLUENZA VACCINE (Yearly - October to March) Next due on 10/1/2023      11/19/2022  Outside Immunization: Flu MDCK Quad P-Free Inj    11/11/2021  Imm Admin: Influenza, Unspecified              MAMMOGRAM (Yearly) Next due on 4/10/2024      04/10/2023  Mammo Screening Digital Tomosynthesis Bilateral With CAD    03/28/2022  Mammo Diagnostic Digital Tomosynthesis Bilateral With CAD    02/11/2020  SCANNED - MAMMO              PAP SMEAR (Every 3 Years) Next due on 10/18/2025      10/18/2022  IGP, Aptima HPV, Rfx 16 / 18,45              TDAP/TD VACCINES (2 - Tdap) Next due on 1/1/2026 01/01/2016  Imm Admin: Td              COLORECTAL CANCER SCREENING (COLONOSCOPY - Every 10 Years) Next due on 3/21/2032      03/21/2022  COLONOSCOPY    03/21/2022  Surgical Procedure: COLONOSCOPY              HEPATITIS C SCREENING  Completed      06/08/2022  Hep C Virus Ab component of Hepatitis C antibody              Pneumococcal Vaccine 0-64 (Series Information) Aged Out      No completion history exists for this topic.                     Allergies   Allergen Reactions    Penicillins Hives        Outpatient Medications Marked as Taking for the 8/7/23 encounter (Office Visit) with Chente Mathews,    Medication Sig Dispense Refill    alosetron (LOTRONEX) 0.5 MG tablet Take 1 tablet by mouth 2 (Two) Times a Day. 180 tablet 3    multivitamin with minerals tablet tablet Take 1 tablet by mouth Daily.      nortriptyline (PAMELOR) 10 MG capsule Take 1 capsule by mouth Every Night. 90 capsule 3        Past Medical History:   Diagnosis Date    Breast cancer     Cancer     breast cancer 2007 DCIS    LEFT    Clotting disorder 2017    Irregular & very heavy flow during menstral cycles. For 36 hours will have  to use super plus tampons & change every 1.5 - 2 hours    Gestational diabetes     Heavy periods     Hx of radiation therapy     IBS (irritable bowel syndrome)     with diarrhea    Iron deficiency     IUD (intrauterine device) in place     Low blood pressure     Migraine 2007    On & off throughout the years. Typically once a quarter    Ovarian cyst 1997    Had cyst rupture while in undergrad, went on birth control pills at that time    Trigger finger of right thumb     s/p steroid injection with hand surgery    Urinary tract infection Feb 2021    First UTI last year    Varicella 1986    Had while in 5th grade     Past Surgical History:   Procedure Laterality Date    BREAST BIOPSY  March 2007    Had DCIS in left breast with surgery to remove the mass. Followed by radiation & 3.5 years of Tamoxifen. Was tested for BRCA 1 & 2 in 2005 with a recent updated tested with Dr. Calderón    BREAST LUMPECTOMY Left 2007    CARDIAC CATHETERIZATION  2001    Table top test. Had issues with fainting. Low blood pressure & was on medication until pregnancy. Haven't had issues since.    COLONOSCOPY  approx 2011    negative per pt     COLONOSCOPY N/A 03/21/2022    Procedure: COLONOSCOPY to cecum and TI with biopsies;  Surgeon: Tuan Blanco MD;  Location: [facility] ENDOSCOPY;  Service: Gastroenterology;  Laterality: N/A;  pre-change in bowel habits  post-hemorrhoids    D & C HYSTEROSCOPY ENDOMETRIAL ABLATION N/A 11/30/2022    Procedure: DILATATION AND CURETTAGE HYSTEROSCOPY NOVASURE ENDOMETRIAL ABLATION;  Surgeon: Real Posada MD;  Location: [facility] OR;  Service: Obstetrics/Gynecology;  Laterality: N/A;    DIAGNOSTIC LAPAROSCOPY Bilateral 11/30/2022    Procedure: SALPINGECTOMY LAPAROSCOPIC, INTRAUTERINE DEVICE REMOVAL;  Surgeon: Real Posada MD;  Location: [facility] OR;  Service: Obstetrics/Gynecology;  Laterality: Bilateral;    TUBAL ABDOMINAL LIGATION  11.30.22    TUMOR REMOVAL      right foot during childhood    WISDOM TOOTH  "EXTRACTION  1996    During undergrad     Family History   Adopted: Yes   Problem Relation Age of Onset    Malig Hyperthermia Neg Hx     reports that she has quit smoking. Her smoking use included cigarettes. She has never used smokeless tobacco. She reports current alcohol use of about 7.0 standard drinks per week. She reports that she does not use drugs.    Immunization History   Administered Date(s) Administered    COVID-19 (MODERNA) 1st,2nd,3rd Dose Monovalent 03/17/2021, 04/14/2021, 11/27/2021    Influenza, Unspecified 11/11/2021    Td (TDVAX) 01/01/2016        OBJECTIVE    Vital Signs:   /66   Pulse 113   Temp 97.8 øF (36.6 øC) (Infrared)   Ht 161.3 cm (63.5\")   Wt 51.3 kg (113 lb)   SpO2 99%   BMI 19.70 kg/mý     Physical Exam  Vitals reviewed.   Constitutional:       General: She is not in acute distress.     Appearance: Normal appearance. She is normal weight. She is not ill-appearing.   HENT:      Head: Normocephalic and atraumatic.      Right Ear: Tympanic membrane, ear canal and external ear normal. There is no impacted cerumen.      Left Ear: Tympanic membrane, ear canal and external ear normal. There is no impacted cerumen.      Mouth/Throat:      Mouth: Mucous membranes are moist.      Pharynx: No oropharyngeal exudate or posterior oropharyngeal erythema.   Eyes:      General: No scleral icterus.     Extraocular Movements: Extraocular movements intact.      Conjunctiva/sclera: Conjunctivae normal.      Pupils: Pupils are equal, round, and reactive to light.   Cardiovascular:      Rate and Rhythm: Normal rate and regular rhythm.      Heart sounds: Normal heart sounds. No murmur heard.  Pulmonary:      Effort: Pulmonary effort is normal. No respiratory distress.      Breath sounds: Normal breath sounds. No wheezing.   Abdominal:      General: Bowel sounds are normal. There is no distension.      Palpations: Abdomen is soft.      Tenderness: There is no abdominal tenderness. There is no " guarding.   Musculoskeletal:      Cervical back: Neck supple. No tenderness.      Right lower leg: No edema.      Left lower leg: No edema.   Lymphadenopathy:      Cervical: No cervical adenopathy.   Skin:     General: Skin is warm and dry.      Coloration: Skin is not jaundiced.   Neurological:      General: No focal deficit present.      Mental Status: She is alert and oriented to person, place, and time.      Cranial Nerves: No cranial nerve deficit.      Motor: No weakness.      Comments: No appreciable tremor b/l upper extremities    Psychiatric:         Mood and Affect: Mood normal.         Behavior: Behavior normal.         Thought Content: Thought content normal.                       ASSESSMENT & PLAN     #Annual Preventative Health Examination   -Age and sex appropriate physical exam performed and documented. Updated past medical, family, social and surgical histories as well as allergies and care team list. Addressed care gaps listed in the medical record.  -Encouraged annual dental and vision exams as part of their overall health.  -Encouraged minimum of 30 minutes or more of exercise at a brisk walk or higher 5 days per week combined with a well-balanced diet.  -Immunizations reviewed and updated in EMR.  -Lipid screening:  Will screen for hyperlipidemia today and calculate ASCVD risk if appropriate.    -Aspirin for primary or secondary prevention: Not applicable, patient is less than age 50.  -Depression and Anxiety screening: PHQ2 performed and the patient's screen was negative.  -Diabetes screening:  will screen today  -Tobacco use screening: Patient denies cigarette use. Tobacco counseling was was not indicated.  -Alcohol use screening: Patient reports drinking 7 drinks per week.. Alcohol abuse counseling was was not indicated.  -Illicit drug screening: Patient does not use illicit drugs.  -Hypertension screening: Patient screened negative for HTN today.  -HIV screening: Discussed with patient the  "importance of identifying asymptomatic HIV infection for adults in their age group with a one time screening test .Patient declined screening.   -Syphilis screening: Syphilis screening not indicated.  -Hepatitis B virus screening: Screening not indicated, not in a high-risk group.  -Hepatitis C virus screening:  Patient has already completed Hepatitis C screening. Negative screening on file.   -Colon cancer screening: Patient is already up to date on their colon cancer screening with colonoscopy is indicated again in 2032.  -Lung cancer screening: Patient is less than age 50, screening not indicated.  -Cervical cancer screening:  Informed patient that the USPSTF recommends screening for cervical cancer every 3 years with cervical cytology alone in women aged 21 to 29 years. For women aged 30 to 65 years, the USPSTF recommends screening every 3 years with cervical cytology alone, every 5 years with high-risk human papillomavirus (hrHPV) testing alone, or every 5 years with HPV testing in combination with cytology (cotesting). Lasp pap : was normal 10/2022 with cotesting  -Breast cancer screening:  Was done approximately 4/2023 and the result was: Birads I (Normal)..  -Osteoporosis screening: Informed patient that the USPSTF recommends screening for osteoporosis with bone measurement testing to prevent osteoporotic fractures in women 65 years and older. Screening is not applicable at this time.    Follow up in 1 year for annual physical exam.    Patient/family had no further questions at this time and verbalized understanding of the plan discussed today.       1. Tremor (Primary)  -Shaking of the left hand: notices it more at the end of the day such as when she is at home after work spending time with her family such as when it is placed at rest. She was carrying some plates with her left hand this weekend and the plates were slightly shaky because of the movement. Overall states it is \"super minor\". Going on since " [id number] and seems like it is more noticeable. Probably happens every several weeks. States she doesn't know any family history.   -no appreciable tremor on exam   -will refer to neurology for further eval   -     Ambulatory Referral to Neurology            The following social determinates of health impact the patient's medical decision making: No social determinates of health were factored in to today's visit.     Follow Up  Return in about 1 year (around 8/7/2024) for Annual physical.

## 2023-08-08 LAB
ALBUMIN SERPL-MCNC: 4.8 G/DL (ref 3.5–5.2)
ALBUMIN/GLOB SERPL: 2.4 G/DL
ALP SERPL-CCNC: 35 U/L (ref 39–117)
ALT SERPL-CCNC: 13 U/L (ref 1–33)
AST SERPL-CCNC: 15 U/L (ref 1–32)
BILIRUB SERPL-MCNC: 0.7 MG/DL (ref 0–1.2)
BUN SERPL-MCNC: 14 MG/DL (ref 6–20)
BUN/CREAT SERPL: 18.4 (ref 7–25)
CALCIUM SERPL-MCNC: 9.4 MG/DL (ref 8.6–10.5)
CHLORIDE SERPL-SCNC: 103 MMOL/L (ref 98–107)
CHOLEST SERPL-MCNC: 194 MG/DL (ref 0–200)
CO2 SERPL-SCNC: 24.5 MMOL/L (ref 22–29)
CREAT SERPL-MCNC: 0.76 MG/DL (ref 0.57–1)
EGFRCR SERPLBLD CKD-EPI 2021: 98 ML/MIN/1.73
GLOBULIN SER CALC-MCNC: 2 GM/DL
GLUCOSE SERPL-MCNC: 96 MG/DL (ref 65–99)
HBA1C MFR BLD: 4.9 % (ref 4.8–5.6)
HDLC SERPL-MCNC: 102 MG/DL (ref 40–60)
LDLC SERPL CALC-MCNC: 80 MG/DL (ref 0–100)
LDLC/HDLC SERPL: 0.78 {RATIO}
POTASSIUM SERPL-SCNC: 4.6 MMOL/L (ref 3.5–5.2)
PROT SERPL-MCNC: 6.8 G/DL (ref 6–8.5)
SODIUM SERPL-SCNC: 140 MMOL/L (ref 136–145)
TRIGL SERPL-MCNC: 63 MG/DL (ref 0–150)
VLDLC SERPL CALC-MCNC: 12 MG/DL (ref 5–40)

## 2023-12-04 ENCOUNTER — HOSPITAL ENCOUNTER (OUTPATIENT)
Facility: HOSPITAL | Age: 48
Discharge: HOME | End: 2023-12-04
Admitting: NURSE PRACTITIONER
Payer: COMMERCIAL

## 2023-12-04 ENCOUNTER — LAB (OUTPATIENT)
Facility: HOSPITAL | Age: 48
End: 2023-12-04
Payer: COMMERCIAL

## 2023-12-04 ENCOUNTER — TELEPHONE (OUTPATIENT)
Dept: INTERNAL MEDICINE | Facility: CLINIC | Age: 48
End: 2023-12-04

## 2023-12-04 ENCOUNTER — OFFICE VISIT (OUTPATIENT)
Dept: INTERNAL MEDICINE | Facility: CLINIC | Age: 48
End: 2023-12-04
Payer: COMMERCIAL

## 2023-12-04 VITALS
HEART RATE: 108 BPM | WEIGHT: 113 LBS | DIASTOLIC BLOOD PRESSURE: 64 MMHG | BODY MASS INDEX: 19.29 KG/M2 | HEIGHT: 64 IN | RESPIRATION RATE: 18 BRPM | OXYGEN SATURATION: 96 % | TEMPERATURE: 99.1 F | SYSTOLIC BLOOD PRESSURE: 118 MMHG

## 2023-12-04 DIAGNOSIS — R06.02 SHORTNESS OF BREATH: ICD-10-CM

## 2023-12-04 DIAGNOSIS — N39.0 ACUTE UTI: ICD-10-CM

## 2023-12-04 DIAGNOSIS — R50.9 FEVER, UNSPECIFIED FEVER CAUSE: Primary | ICD-10-CM

## 2023-12-04 DIAGNOSIS — R05.1 ACUTE COUGH: ICD-10-CM

## 2023-12-04 DIAGNOSIS — R50.9 FEVER, UNSPECIFIED FEVER CAUSE: ICD-10-CM

## 2023-12-04 LAB
ALBUMIN SERPL-MCNC: 4.4 G/DL (ref 3.5–5.2)
ALBUMIN/GLOB SERPL: 1.8 G/DL
ALP SERPL-CCNC: 43 U/L (ref 39–117)
ALT SERPL W P-5'-P-CCNC: 20 U/L (ref 1–33)
ANION GAP SERPL CALCULATED.3IONS-SCNC: 9 MMOL/L (ref 5–15)
AST SERPL-CCNC: 17 U/L (ref 1–32)
BACTERIA UR QL AUTO: ABNORMAL /HPF
BILIRUB SERPL-MCNC: 0.3 MG/DL (ref 0–1.2)
BILIRUB UR QL STRIP: NEGATIVE
BUN SERPL-MCNC: 9 MG/DL (ref 6–20)
BUN/CREAT SERPL: 11.3 (ref 7–25)
CALCIUM SPEC-SCNC: 8.8 MG/DL (ref 8.6–10.5)
CHLORIDE SERPL-SCNC: 103 MMOL/L (ref 98–107)
CLARITY UR: ABNORMAL
CO2 SERPL-SCNC: 24 MMOL/L (ref 22–29)
COLOR UR: ABNORMAL
CREAT SERPL-MCNC: 0.8 MG/DL (ref 0.57–1)
DEPRECATED RDW RBC AUTO: 42.8 FL (ref 37–54)
EGFRCR SERPLBLD CKD-EPI 2021: 91.6 ML/MIN/1.73
EOSINOPHIL # BLD MANUAL: 0.45 10*3/MM3 (ref 0–0.4)
EOSINOPHIL NFR BLD MANUAL: 5.1 % (ref 0.3–6.2)
ERYTHROCYTE [DISTWIDTH] IN BLOOD BY AUTOMATED COUNT: 12.2 % (ref 12.3–15.4)
EXPIRATION DATE: NORMAL
FLUAV AG UPPER RESP QL IA.RAPID: NOT DETECTED
FLUBV AG UPPER RESP QL IA.RAPID: NOT DETECTED
GLOBULIN UR ELPH-MCNC: 2.5 GM/DL
GLUCOSE SERPL-MCNC: 125 MG/DL (ref 65–99)
GLUCOSE UR STRIP-MCNC: NEGATIVE MG/DL
HCT VFR BLD AUTO: 40.6 % (ref 34–46.6)
HGB BLD-MCNC: 13.8 G/DL (ref 12–15.9)
HGB UR QL STRIP.AUTO: ABNORMAL
HOLD SPECIMEN: NORMAL
HOLD SPECIMEN: NORMAL
HYALINE CASTS UR QL AUTO: ABNORMAL /LPF
INTERNAL CONTROL: NORMAL
KETONES UR QL STRIP: ABNORMAL
LEUKOCYTE ESTERASE UR QL STRIP.AUTO: ABNORMAL
LYMPHOCYTES # BLD MANUAL: 0.35 10*3/MM3 (ref 0.7–3.1)
LYMPHOCYTES NFR BLD MANUAL: 1 % (ref 5–12)
Lab: NORMAL
MCH RBC QN AUTO: 32.4 PG (ref 26.6–33)
MCHC RBC AUTO-ENTMCNC: 34 G/DL (ref 31.5–35.7)
MCV RBC AUTO: 95.3 FL (ref 79–97)
MONOCYTES # BLD: 0.09 10*3/MM3 (ref 0.1–0.9)
MUCOUS THREADS URNS QL MICRO: ABNORMAL /HPF
NEUTROPHILS # BLD AUTO: 7.88 10*3/MM3 (ref 1.7–7)
NEUTROPHILS NFR BLD MANUAL: 89.9 % (ref 42.7–76)
NITRITE UR QL STRIP: NEGATIVE
NRBC BLD AUTO-RTO: 0 /100 WBC (ref 0–0.2)
PH UR STRIP.AUTO: 6 [PH] (ref 5–8)
PLAT MORPH BLD: NORMAL
PLATELET # BLD AUTO: 195 10*3/MM3 (ref 140–450)
PMV BLD AUTO: 9.5 FL (ref 6–12)
POTASSIUM SERPL-SCNC: 4.1 MMOL/L (ref 3.5–5.2)
PROT SERPL-MCNC: 6.9 G/DL (ref 6–8.5)
PROT UR QL STRIP: ABNORMAL
RBC # BLD AUTO: 4.26 10*6/MM3 (ref 3.77–5.28)
RBC # UR STRIP: ABNORMAL /HPF
RBC MORPH BLD: NORMAL
REF LAB TEST METHOD: ABNORMAL
SARS-COV-2 AG UPPER RESP QL IA.RAPID: NOT DETECTED
SODIUM SERPL-SCNC: 136 MMOL/L (ref 136–145)
SP GR UR STRIP: >=1.03 (ref 1–1.03)
SQUAMOUS #/AREA URNS HPF: ABNORMAL /HPF
UROBILINOGEN UR QL STRIP: ABNORMAL
VARIANT LYMPHS NFR BLD MANUAL: 4 % (ref 19.6–45.3)
WBC # UR STRIP: ABNORMAL /HPF
WBC MORPH BLD: NORMAL
WBC NRBC COR # BLD AUTO: 8.76 10*3/MM3 (ref 3.4–10.8)

## 2023-12-04 PROCEDURE — 80053 COMPREHEN METABOLIC PANEL: CPT | Performed by: NURSE PRACTITIONER

## 2023-12-04 PROCEDURE — 85025 COMPLETE CBC W/AUTO DIFF WBC: CPT | Performed by: NURSE PRACTITIONER

## 2023-12-04 PROCEDURE — 36415 COLL VENOUS BLD VENIPUNCTURE: CPT | Performed by: NURSE PRACTITIONER

## 2023-12-04 PROCEDURE — 71046 X-RAY EXAM CHEST 2 VIEWS: CPT

## 2023-12-04 PROCEDURE — 85007 BL SMEAR W/DIFF WBC COUNT: CPT | Performed by: NURSE PRACTITIONER

## 2023-12-04 PROCEDURE — 81001 URINALYSIS AUTO W/SCOPE: CPT | Performed by: NURSE PRACTITIONER

## 2023-12-04 PROCEDURE — 99213 OFFICE O/P EST LOW 20 MIN: CPT | Performed by: NURSE PRACTITIONER

## 2023-12-04 PROCEDURE — 87428 SARSCOV & INF VIR A&B AG IA: CPT | Performed by: NURSE PRACTITIONER

## 2023-12-04 RX ORDER — AZITHROMYCIN 250 MG/1
TABLET, FILM COATED ORAL
Qty: 6 TABLET | Refills: 0 | Status: SHIPPED | OUTPATIENT
Start: 2023-12-04

## 2023-12-04 NOTE — TELEPHONE ENCOUNTER
Reviewed labs and xray report with the patient over the phone   Patchy infiltrate is  demonstrated within the lingular segment, possible pneumonia. From the  appearance of the parenchyma, COPD/emphysema suggested. There is a fine linear area of scarring at the right apex.   Will call in azithromycin    , recommend coughing and deep breath, plenty of rest and fluids. Scheduled a follow up with Dr pederson at the end of the week for recheck  Urine culture is pending

## 2023-12-04 NOTE — PROGRESS NOTES
Subjective   Patti Hogue is a 48 y.o. female.   Chief Complaint   Patient presents with    Fever    Back Pain    Generalized Body Aches    Shortness of Breath     Vitals:    12/04/23 0933   BP: 118/64   Pulse: 108   Resp: 18   Temp: 99.1 °F (37.3 °C)   SpO2: 96%     No LMP recorded. Patient has had an ablation.    History of Present Illness  Mrs Hogue is a 48 year old female patient of Dr Mathews who is here for an acute visit. She c/o fever with a tmax of 101.8, dry cough, chest tightness, shortness of breath when she lays down that started on Saturday 12/2/23. She denies any ill exposure but did travel to [place] for [facility] over a week ago. She was treated there for a UTI with nitrofurantoin and has one more day of treatment. I do not have those records to review. She is alternating tylenol and motrin. Last dose of tylenol was 4 hours ago at 5am   She is here with her  today     The following portions of the patient's history were reviewed and updated as appropriate: allergies, current medications, past family history, past medical history, past social history, past surgical history, and problem list.    Review of Systems   Constitutional:  Positive for fatigue and fever (tmax 101.8).   HENT:  Negative for postnasal drip and sore throat.    Respiratory:  Positive for cough (dry), chest tightness and shortness of breath.    Cardiovascular:  Negative for chest pain, palpitations and leg swelling.   Gastrointestinal:  Negative for abdominal pain, diarrhea, nausea and vomiting.   Genitourinary:  Negative for dysuria and urgency.   Musculoskeletal:  Positive for arthralgias and back pain.   Neurological:  Positive for dizziness.       Objective   Physical Exam  Vitals and nursing note reviewed.   Constitutional:       General: She is not in acute distress.     Appearance: Normal appearance. She is well-developed and well-groomed.   HENT:      Head: Normocephalic.      Right Ear: Tympanic membrane and ear  canal normal.      Left Ear: Tympanic membrane and ear canal normal.      Nose: Nose normal.      Mouth/Throat:      Pharynx: Oropharynx is clear.   Neurological:      Mental Status: She is alert.         Assessment & Plan   Diagnoses and all orders for this visit:    1. Fever, unspecified fever cause (Primary)  -     POCT SARS-CoV-2 Antigen WARREN + Flu  -     CBC & Differential  -     Comprehensive Metabolic Panel  -     Urinalysis With Culture If Indicated - Urine, Clean Catch  -     XR Chest PA & Lateral; Future  -     [facility] Urine Culture Tube - Urine, Clean Catch    2. Shortness of breath  -     XR Chest PA & Lateral; Future    3. Acute cough    4. Acute UTI      Covid and flu are negative   Will check labs today and chest xray   She is almost finished with nitrofurantoin for acute UTI tx at the urgent care, will repeat UA with culture today   Recommend continuing to alternate tylenol and motrin as needed  Rest and push fluids   Plan pending labs and chest xray   Follow up if symptoms persist, worsen or new symptoms develop  Recommend going to the ER for weakness, lethargy, chest pain, worsening shortness of breath

## 2023-12-08 ENCOUNTER — OFFICE VISIT (OUTPATIENT)
Dept: INTERNAL MEDICINE | Facility: CLINIC | Age: 48
End: 2023-12-08
Payer: COMMERCIAL

## 2023-12-08 VITALS
HEART RATE: 114 BPM | OXYGEN SATURATION: 99 % | SYSTOLIC BLOOD PRESSURE: 120 MMHG | WEIGHT: 116 LBS | HEIGHT: 64 IN | BODY MASS INDEX: 19.81 KG/M2 | TEMPERATURE: 97.9 F | DIASTOLIC BLOOD PRESSURE: 82 MMHG

## 2023-12-08 DIAGNOSIS — J18.9 COMMUNITY ACQUIRED PNEUMONIA, UNSPECIFIED LATERALITY: ICD-10-CM

## 2023-12-08 DIAGNOSIS — J98.4 APICAL LUNG SCARRING: ICD-10-CM

## 2023-12-08 DIAGNOSIS — R93.89 ABNORMAL CHEST X-RAY: Primary | ICD-10-CM

## 2023-12-08 DIAGNOSIS — G43.911 INTRACTABLE MIGRAINE WITH STATUS MIGRAINOSUS, UNSPECIFIED MIGRAINE TYPE: ICD-10-CM

## 2023-12-08 PROCEDURE — 99214 OFFICE O/P EST MOD 30 MIN: CPT | Performed by: STUDENT IN AN ORGANIZED HEALTH CARE EDUCATION/TRAINING PROGRAM

## 2023-12-08 RX ORDER — RIMEGEPANT SULFATE 75 MG/75MG
1 TABLET, ORALLY DISINTEGRATING ORAL DAILY PRN
Qty: 2 TABLET | Refills: 0 | COMMUNITY
Start: 2023-12-08 | End: 2023-12-11

## 2023-12-08 NOTE — PROGRESS NOTES
"  Chente Mathews D.O.  Internal Medicine  [facility]  [address]  [place] [address]  [phone]      Chief Complaint  Follow-up on pneumonia    SUBJECTIVE    History of Present Illness    Patti Hogue is a 48 y.o. female who presents to the office today as an established patient that last saw me on 8/7/2023.     Per 12/4/23 HPI with my partner Ana Jessica APRN \"Mrs Hogue is a 48 year old female patient of Dr Mathews who is here for an acute visit. She c/o fever with a tmax of 101.8, dry cough, chest tightness, shortness of breath when she lays down that started on Saturday 12/2/23. She denies any ill exposure but did [facility] [place] [facility]. She was treated there for a UTI with nitrofurantoin and has one more day of treatment. I do not have those records to review. She is alternating tylenol and motrin. Last dose of tylenol was 4 hours ago at 5am   She is here with her  today \"    Chest xray came back abnormal after that visit and she was prescribed azithromycin by Hyun Lopez and tomorrow is her last dose. She states she feels 100% better from where she was. Her headache and dizziness has not resolved, especially if she bends over. Up until yesterday her headache was throbbing, intense pain. She has been trying to maintain hydration with alternating water and gatorade. Urinary symptoms have improved. She no longer deals with as much pain when she takes a deep breath in. No vision changes associated with her headache. States she has suffered from migraines in the past.     Allergies   Allergen Reactions    Penicillins Hives        Outpatient Medications Marked as Taking for the 12/8/23 encounter (Office Visit) with Chente Mathews, DO   Medication Sig Dispense Refill    alosetron (LOTRONEX) 0.5 MG tablet Take 1 tablet by mouth 2 (Two) Times a Day. 180 tablet 3    azithromycin (Zithromax Z-Yvan) 250 MG tablet Take 2 tablets the first day, then " "1 tablet daily for 4 days. 6 tablet 0    multivitamin with minerals tablet tablet Take 1 tablet by mouth Daily.      nortriptyline (PAMELOR) 10 MG capsule Take 1 capsule by mouth Every Night. 90 capsule 3        Past Medical History:   Diagnosis Date    Breast cancer     Cancer     breast cancer 2007 DCIS    LEFT    Clotting disorder 2017    Irregular & very heavy flow during menstral cycles. For 36 hours will have to use super plus tampons & change every 1.5 - 2 hours    Gestational diabetes     Heavy periods     Hx of radiation therapy     IBS (irritable bowel syndrome)     with diarrhea    Iron deficiency     IUD (intrauterine device) in place     Low blood pressure     Migraine 2007    On & off throughout the years. Typically once a quarter    Ovarian cyst 1997    Had cyst rupture while in undergrad, went on birth control pills at that time    Trigger finger of right thumb     s/p steroid injection with hand surgery    Urinary tract infection Feb 2021    First UTI last year    Varicella 1986    Had while in 5th grade       OBJECTIVE    Vital Signs:   /82   Pulse 114   Temp 97.9 °F (36.6 °C) (Infrared)   Ht 161.3 cm (63.5\")   Wt 52.6 kg (116 lb)   SpO2 99%   BMI 20.23 kg/m²     Physical Exam  Vitals reviewed.   Constitutional:       General: She is not in acute distress.     Appearance: Normal appearance. She is normal weight. She is not ill-appearing.   Eyes:      General: No scleral icterus.  Cardiovascular:      Rate and Rhythm: Normal rate and regular rhythm.      Heart sounds: Normal heart sounds. No murmur heard.  Pulmonary:      Effort: Pulmonary effort is normal. No respiratory distress.      Breath sounds: Normal breath sounds. No stridor. No wheezing or rhonchi.   Skin:     Coloration: Skin is not jaundiced.   Neurological:      Mental Status: She is alert.   Psychiatric:         Mood and Affect: Mood normal.         Behavior: Behavior normal.         Thought Content: Thought content normal. "                           XR CHEST PA AND LATERAL-     Clinical: Cough, shortness of breath, fever     FINDINGS: Cardiac size within normal limits. Patchy infiltrate is  demonstrated within the lingular segment, possible pneumonia. From the  appearance of the parenchyma, COPD/emphysema suggested. There is a fine  linear area of scarring at the right apex. No vascular congestion or  pleural effusion seen. No mediastinal or hilar abnormality is  demonstrated. The remainder is unremarkable.     This report was finalized on 12/4/2023 1:52 PM by Dr. Av Maza M.D  on Workstation: [id number]    ASSESSMENT & PLAN     Diagnoses and all orders for this visit:    1. Abnormal chest x-ray (Primary)  2. Apical lung scarring  3. Community acquired pneumonia, unspecified laterality  4. migraine  -pt here today for follow up from recent visit with my partner Ana LINDSAY at which point CAP was diagnosed on chest xray and she was treated with azithromycin  -she is overall feeling better and shortness of air has resolved. She is normotensive and afebrile on exam today and satting 99% on room air. Predominant symptoms remaining are dizziness when bending over (could be a component of BPPV) and headache (she has a history of migraine).  -lungs are clear on exam today  -I reviewed her chest xray from 12/4 and am concerned regarding the findings suggestive of COPD/emphysema and fine linear scarring in the Right apex. She denies any chronic cough, dyspnea, sputum production.   -at this point I would like to get a CT high res to further eval for lung pathology and she is agreeable. Further plan depending on results   -for headache I will provide a nurtect sample and she can use this once daily as needed.   -If no improvement or suddenly worsening symptoms she should let me know or report to ER or urgent care for re-evaluation.           The following social determinates of health impact the patient's medical decision making: No  social determinates of health were factored in to today's visit.     Follow Up  No follow-ups on file.    Patient/family had no further questions at this time and verbalized understanding of the plan discussed today.

## 2023-12-11 DIAGNOSIS — G43.911 INTRACTABLE MIGRAINE WITH STATUS MIGRAINOSUS, UNSPECIFIED MIGRAINE TYPE: Primary | ICD-10-CM

## 2023-12-11 RX ORDER — RIMEGEPANT SULFATE 75 MG/75MG
1 TABLET, ORALLY DISINTEGRATING ORAL DAILY PRN
Qty: 8 TABLET | Refills: 5 | Status: SHIPPED | OUTPATIENT
Start: 2023-12-11

## 2023-12-22 ENCOUNTER — HOSPITAL ENCOUNTER (OUTPATIENT)
Dept: CT IMAGING | Facility: HOSPITAL | Age: 48
Discharge: HOME | End: 2023-12-22
Admitting: STUDENT IN AN ORGANIZED HEALTH CARE EDUCATION/TRAINING PROGRAM
Payer: COMMERCIAL

## 2023-12-22 PROCEDURE — 71250 CT THORAX DX C-: CPT

## 2024-01-02 ENCOUNTER — LAB (OUTPATIENT)
Dept: LAB | Facility: HOSPITAL | Age: 49
End: 2024-01-02
Payer: COMMERCIAL

## 2024-01-02 DIAGNOSIS — Z86.000 HISTORY OF DUCTAL CARCINOMA IN SITU (DCIS) OF BREAST: ICD-10-CM

## 2024-01-02 DIAGNOSIS — D05.12 BREAST NEOPLASM, TIS (DCIS), LEFT: ICD-10-CM

## 2024-01-02 LAB
ALBUMIN SERPL-MCNC: 4.3 G/DL (ref 3.5–5.2)
ALBUMIN/GLOB SERPL: 2 G/DL
ALP SERPL-CCNC: 38 U/L (ref 39–117)
ALT SERPL W P-5'-P-CCNC: 9 U/L (ref 1–33)
ANION GAP SERPL CALCULATED.3IONS-SCNC: 9.3 MMOL/L (ref 5–15)
AST SERPL-CCNC: 16 U/L (ref 1–32)
BASOPHILS # BLD AUTO: 0.02 10*3/MM3 (ref 0–0.2)
BASOPHILS NFR BLD AUTO: 0.5 % (ref 0–1.5)
BILIRUB SERPL-MCNC: 0.5 MG/DL (ref 0–1.2)
BUN SERPL-MCNC: 18 MG/DL (ref 6–20)
BUN/CREAT SERPL: 22 (ref 7–25)
CALCIUM SPEC-SCNC: 8.9 MG/DL (ref 8.6–10.5)
CHLORIDE SERPL-SCNC: 100 MMOL/L (ref 98–107)
CO2 SERPL-SCNC: 26.7 MMOL/L (ref 22–29)
CREAT SERPL-MCNC: 0.82 MG/DL (ref 0.57–1)
DEPRECATED RDW RBC AUTO: 47 FL (ref 37–54)
EGFRCR SERPLBLD CKD-EPI 2021: 88.9 ML/MIN/1.73
EOSINOPHIL # BLD AUTO: 0.21 10*3/MM3 (ref 0–0.4)
EOSINOPHIL NFR BLD AUTO: 4.7 % (ref 0.3–6.2)
ERYTHROCYTE [DISTWIDTH] IN BLOOD BY AUTOMATED COUNT: 13 % (ref 12.3–15.4)
FERRITIN SERPL-MCNC: 86.5 NG/ML (ref 13–150)
GLOBULIN UR ELPH-MCNC: 2.2 GM/DL
GLUCOSE SERPL-MCNC: 104 MG/DL (ref 65–99)
HCT VFR BLD AUTO: 38.6 % (ref 34–46.6)
HGB BLD-MCNC: 12.6 G/DL (ref 12–15.9)
IMM GRANULOCYTES # BLD AUTO: 0.01 10*3/MM3 (ref 0–0.05)
IMM GRANULOCYTES NFR BLD AUTO: 0.2 % (ref 0–0.5)
IRON 24H UR-MRATE: 152 MCG/DL (ref 37–145)
IRON SATN MFR SERPL: 48 % (ref 20–50)
LYMPHOCYTES # BLD AUTO: 1.66 10*3/MM3 (ref 0.7–3.1)
LYMPHOCYTES NFR BLD AUTO: 37.4 % (ref 19.6–45.3)
MCH RBC QN AUTO: 31.8 PG (ref 26.6–33)
MCHC RBC AUTO-ENTMCNC: 32.6 G/DL (ref 31.5–35.7)
MCV RBC AUTO: 97.5 FL (ref 79–97)
MONOCYTES # BLD AUTO: 0.33 10*3/MM3 (ref 0.1–0.9)
MONOCYTES NFR BLD AUTO: 7.4 % (ref 5–12)
NEUTROPHILS NFR BLD AUTO: 2.21 10*3/MM3 (ref 1.7–7)
NEUTROPHILS NFR BLD AUTO: 49.8 % (ref 42.7–76)
NRBC BLD AUTO-RTO: 0 /100 WBC (ref 0–0.2)
PLATELET # BLD AUTO: 215 10*3/MM3 (ref 140–450)
PMV BLD AUTO: 8.7 FL (ref 6–12)
POTASSIUM SERPL-SCNC: 4.2 MMOL/L (ref 3.5–5.2)
PROT SERPL-MCNC: 6.5 G/DL (ref 6–8.5)
RBC # BLD AUTO: 3.96 10*6/MM3 (ref 3.77–5.28)
SODIUM SERPL-SCNC: 136 MMOL/L (ref 136–145)
TIBC SERPL-MCNC: 315 MCG/DL (ref 298–536)
TRANSFERRIN SERPL-MCNC: 225 MG/DL (ref 200–360)
WBC NRBC COR # BLD AUTO: 4.44 10*3/MM3 (ref 3.4–10.8)

## 2024-01-02 PROCEDURE — 80053 COMPREHEN METABOLIC PANEL: CPT

## 2024-01-02 PROCEDURE — 85025 COMPLETE CBC W/AUTO DIFF WBC: CPT

## 2024-01-02 PROCEDURE — 82728 ASSAY OF FERRITIN: CPT

## 2024-01-02 PROCEDURE — 36415 COLL VENOUS BLD VENIPUNCTURE: CPT

## 2024-01-02 PROCEDURE — 84466 ASSAY OF TRANSFERRIN: CPT

## 2024-01-02 PROCEDURE — 83540 ASSAY OF IRON: CPT

## 2024-01-08 ENCOUNTER — OFFICE VISIT (OUTPATIENT)
Dept: ONCOLOGY | Facility: CLINIC | Age: 49
End: 2024-01-08
Payer: COMMERCIAL

## 2024-01-08 VITALS
SYSTOLIC BLOOD PRESSURE: 128 MMHG | OXYGEN SATURATION: 99 % | HEART RATE: 76 BPM | BODY MASS INDEX: 19.68 KG/M2 | DIASTOLIC BLOOD PRESSURE: 81 MMHG | WEIGHT: 115.3 LBS | RESPIRATION RATE: 18 BRPM | TEMPERATURE: 97.8 F | HEIGHT: 64 IN

## 2024-01-08 DIAGNOSIS — Z86.000 HISTORY OF DUCTAL CARCINOMA IN SITU (DCIS) OF BREAST: ICD-10-CM

## 2024-01-08 DIAGNOSIS — D50.0 IRON DEFICIENCY ANEMIA DUE TO CHRONIC BLOOD LOSS: Primary | ICD-10-CM

## 2024-01-08 PROCEDURE — 99214 OFFICE O/P EST MOD 30 MIN: CPT | Performed by: INTERNAL MEDICINE

## 2024-01-08 NOTE — LETTER
January 8, 2024     Chente Mathews DO  [address]  [place] [id number]    Patient: Patti Hogue   YOB: 1975   Date of Visit: 1/8/2024     Dear Chente Mathews DO:       Thank you for referring Patti Hogue to me for evaluation. Below are the relevant portions of my assessment and plan of care.    If you have questions, please do not hesitate to call me. I look forward to following Patti along with you.         Sincerely,        Benny Calderón MD        CC: MD Kaitlynn Whitley II, Michael D., MD  01/08/24 2835  Sign when Signing Visit    Subjectivepatient feeling reasonably well    REASON FOR FOLLOWUP: History of DCIS left breast 2007                                  History of Present Illness   The patient is now a 48-year-old female seen in January 2022 by GI medicine presenting to [facility].  She has a known history of gestational diabetes, IBS diarrhea and been treated with Lotronex successfully.  She was seen by Dr. Blanco 1/18/2022 having moved to [place] in February of this year and with a history of IBS initially treated with Viberzi which was unsuccessful and the patient, thereafter, was treated successfully with Lotronex.  Plans were made for subsequent colonoscopy and, additionally, oncology recommendation was requested as result of a previous history of ductal carcinoma in situ.     Her records concerning this included review by Dr. Deepika Tejada of [facility] on 2/12/2020 with a history of DCIS (ER positive) status post lumpectomy and XRT in 2007 followed by tamoxifen x3 years discontinued 2010.  The patient describes genetic assessment for likely BRCA in 2007 which was negative.  The patient's been followed by yearly mammography and possibly every third year MRI.  She has been clinically stable without recurrence undergoing genetic assessment which was evidently unremarkable and also reviewed and treated for iron deficiency  anemia.  Patient studies date from 2/12/2020 with ferritin 29.5, B12 906.5, vitamin D of 30.7, CMP with BUN/creatinine of 13.5 and 0.67, calcium 9.6, normal LFTs, iron of 115, TIBC of 3 1736% saturation, LDH of 142  The patient is initially seen 2/24/2022 at [facility] office and we discussed her history as well as periodic symptoms of left upper extremity tremor, recent ParaGard placement in 2018 for irregular menses, associated iron deficiency and need for additional genetic assessment with extended panel.    The patient subsequent testing included a normal CBC, normal ferritin, iron profile with 10% saturation, CMP normal.  Diagnostic mammography are compared to 2016 along with ultrasound with no evidence in either modality of malignancy in either breast.    The patient underwent colonoscopy 3/21 with no significant abnormalities, biopsy of the descending colon revealed benign colonic mucosa, rectal biopsy was also negative.    The patient is seen 4/21/2022 with recent genetic assessment just drawn, plans for GYN assessment in the next several months and improvement in her IBS symptoms initially intolerant to Xifaxan but improved with Pamelor and Lotronex.  Her menses, ever, remain somewhat erratic even now.    The patient is next assessed 8/11/2022 with normalization of her CBC as well as iron stores.  Her genetics assessment has been completed with variant of unknown significance detected-NF1.  Patient states that her stamina has improved and though her menses are again erratic and often annoying that she feels relatively well.  She has gynecologic follow-up now scheduled.    This led to review by gynecology and the patient offered endometrial ablation as well as permanent sterilization with laparoscopic salpingectomy, IUD removal.  She underwent these procedures 11/30/2022.  Subsequent findings include benign proliferative endometrium, fragments consist of endometrial polyp, stromal breakdown, no  hyperplasia.    Follow-up testing included CBC 1/3/2023 which has normalized with H&H 12.5 and 38.1, subsequent ferritin at 50.40, iron of 258, TIBC of 438 and 59% saturation.  She is feeling reasonably well except for mild tenderness abdominally postoperatively.    We have discussed that her saturation index appears to be moderately elevated?  And she is also concerned about her blood sugar which is also mildly elevated apparently postprandially.      Past Medical History:   Diagnosis Date   • Breast cancer    • Cancer     breast cancer 2007 DCIS    LEFT   • Clotting disorder 2017    Irregular & very heavy flow during menstral cycles. For 36 hours will have to use super plus tampons & change every 1.5 - 2 hours   • Gestational diabetes    • Heavy periods    • Hx of radiation therapy    • IBS (irritable bowel syndrome)     with diarrhea   • Iron deficiency    • IUD (intrauterine device) in place    • Low blood pressure    • Migraine 2007    On & off throughout the years. Typically once a quarter   • Ovarian cyst 1997    Had cyst rupture while in undergrad, went on birth control pills at that time   • Pulmonary nodules    • Trigger finger of right thumb     s/p steroid injection with hand surgery   • Urinary tract infection Feb 2021    First UTI last year   • Varicella 1986    Had while in 5th grade        Past Surgical History:   Procedure Laterality Date   • BREAST BIOPSY  March 2007    Had DCIS in left breast with surgery to remove the mass. Followed by radiation & 3.5 years of Tamoxifen. Was tested for BRCA 1 & 2 in 2005 with a recent updated tested with Dr. Calderón   • BREAST LUMPECTOMY Left 2007   • CARDIAC CATHETERIZATION  2001    Table top test. Had issues with fainting. Low blood pressure & was on medication until pregnancy. Haven’t had issues since.   • COLONOSCOPY  approx 2011    negative per pt    • COLONOSCOPY N/A 03/21/2022    Procedure: COLONOSCOPY to cecum and TI with biopsies;  Surgeon: Francis  Tuan LEBRON MD;  Location: [facility] ENDOSCOPY;  Service: Gastroenterology;  Laterality: N/A;  pre-change in bowel habits  post-hemorrhoids   • D & C HYSTEROSCOPY ENDOMETRIAL ABLATION N/A 11/30/2022    Procedure: DILATATION AND CURETTAGE HYSTEROSCOPY NOVASURE ENDOMETRIAL ABLATION;  Surgeon: Real Posada MD;  Location: [facility] MAIN OR;  Service: Obstetrics/Gynecology;  Laterality: N/A;   • DIAGNOSTIC LAPAROSCOPY Bilateral 11/30/2022    Procedure: SALPINGECTOMY LAPAROSCOPIC, INTRAUTERINE DEVICE REMOVAL;  Surgeon: Real Posada MD;  Location: [facility] OR;  Service: Obstetrics/Gynecology;  Laterality: Bilateral;   • TUBAL ABDOMINAL LIGATION  11.30.22   • TUMOR REMOVAL      right foot during childhood   • WISDOM TOOTH EXTRACTION  1996    During undergrad        Current Outpatient Medications on File Prior to Visit   Medication Sig Dispense Refill   • alosetron (LOTRONEX) 0.5 MG tablet Take 1 tablet by mouth 2 (Two) Times a Day. 180 tablet 3   • multivitamin with minerals tablet tablet Take 1 tablet by mouth Daily.     • nortriptyline (PAMELOR) 10 MG capsule Take 1 capsule by mouth Every Night. 90 capsule 3   • azithromycin (Zithromax Z-Yvan) 250 MG tablet Take 2 tablets the first day, then 1 tablet daily for 4 days. 6 tablet 0   • Rimegepant Sulfate (Nurtec) 75 MG tablet dispersible tablet Take 1 tablet by mouth Daily As Needed (migraine). 8 tablet 5     No current facility-administered medications on file prior to visit.        ALLERGIES:    Allergies   Allergen Reactions   • Penicillins Hives        Social History     Socioeconomic History   • Marital status:      Spouse name: Max   • Number of children: 2   • Years of education: College   Tobacco Use   • Smoking status: Former     Types: Cigarettes   • Smokeless tobacco: Never   • Tobacco comments:     social in college    Vaping Use   • Vaping Use: Never used   Substance and Sexual Activity   • Alcohol use: Yes     Alcohol/week: 7.0 standard drinks of alcohol     " Types: 7 Glasses of wine per week     Comment: On average, glass of wine a day   • Drug use: Never   • Sexual activity: Yes     Partners: Male     Birth control/protection: Tubal ligation        Family History   Adopted: Yes   Problem Relation Age of Onset   • Malig Hyperthermia Neg Hx         Review of Systems   Constitutional: Negative.    HENT: Negative.     Eyes: Negative.    Respiratory: Negative.     Cardiovascular: Negative.    Gastrointestinal:  Diarrhea: See history of present illness.   Endocrine: Negative.    Genitourinary:  Positive for menstrual problem.   Musculoskeletal: Negative.    Skin: Negative.    Allergic/Immunologic: Negative.    Neurological:  Positive for tremors (Left upper extremity, episodic).   Hematological: Negative.    Psychiatric/Behavioral: Negative.         Objective    Vitals:    01/08/24 1208   BP: 128/81   Pulse: 76   Resp: 18   Temp: 97.8 °F (36.6 °C)   TempSrc: Temporal   SpO2: 99%   Weight: 52.3 kg (115 lb 4.8 oz)   Height: 161.3 cm (63.5\")   PainSc: 0-No pain           1/8/2024    12:08 PM   Current Status   ECOG score 0       Physical Exam  Constitutional:       Appearance: Normal appearance. She is normal weight.   HENT:      Head: Normocephalic and atraumatic.      Nose: Nose normal.      Mouth/Throat:      Mouth: Mucous membranes are moist.      Pharynx: Oropharynx is clear.   Eyes:      Extraocular Movements: Extraocular movements intact.      Conjunctiva/sclera: Conjunctivae normal.      Pupils: Pupils are equal, round, and reactive to light.   Cardiovascular:      Rate and Rhythm: Normal rate and regular rhythm.      Pulses: Normal pulses.      Heart sounds: Normal heart sounds.   Pulmonary:      Effort: Pulmonary effort is normal.      Breath sounds: Normal breath sounds.      Comments: Patient status post left breast lumpectomy, well-healed, radiation tattoos recognized, no additional abnormalities  Abdominal:      General: Bowel sounds are normal.      Palpations: " Abdomen is soft.      Comments: Laparoscopic sites, healing   Musculoskeletal:         General: Normal range of motion.      Cervical back: Normal range of motion and neck supple.   Skin:     General: Skin is warm and dry.   Neurological:      General: No focal deficit present.      Mental Status: She is alert and oriented to person, place, and time.   Psychiatric:         Mood and Affect: Mood normal.         Behavior: Behavior normal.           RECENT LABS:  Hematology WBC   Date Value Ref Range Status   01/02/2024 4.44 3.40 - 10.80 10*3/mm3 Final     RBC   Date Value Ref Range Status   01/02/2024 3.96 3.77 - 5.28 10*6/mm3 Final     Hemoglobin   Date Value Ref Range Status   01/02/2024 12.6 12.0 - 15.9 g/dL Final     Hematocrit   Date Value Ref Range Status   01/02/2024 38.6 34.0 - 46.6 % Final     Platelets   Date Value Ref Range Status   01/02/2024 215 140 - 450 10*3/mm3 Final          Assessment & Plan    47-year-old female with history of:    *DCIS left breast 2007 status post lumpectomy, radiation therapy and 3 years of tamoxifen.  Patient describes the tumor was ER positive but is unclear about additional testing.  She was seen in consultation 2/24/2022.   subsequent testing included a normal CBC, normal ferritin, iron profile with 10% saturation, CMP normal.  Diagnostic mammography are compared to 2016 along with ultrasound with no evidence in either modality of malignancy in either breast.  Genetic assessment just initiated 4/21/2022 with results- Variant of unknown significance detected-NF1  Plan screening mammography late March, early April 2023    *IBS  The patient underwent colonoscopy 3/21 with no significant abnormalities, biopsy of the descending colon revealed benign colonic mucosa, rectal biopsy was also negative.  Recent improvement on Pamelor Lotronex    *Erratic menses  Gynecologic consultation-subsequent endometrial ablation as well as permanent sterilization with laparoscopic salpingectomy,  IUD removal.  She underwent these procedures 11/30/2022.  Subsequent findings include benign proliferative endometrium, fragments consist of endometrial polyp, stromal breakdown, no hyperplasia.      *Iron deficiency anemia  Placed on oral iron after visit 2/24/2022  Reassessment of iron status 8/5/2022 finds normal iron profile, ferritin and CBC, oral iron discontinued  Follow-up testing included CBC 1/3/2023 which has normalized with H&H 12.5 and 38.1, subsequent ferritin at 50.40, iron of 258, TIBC of 438 and 59% saturation  Follow-up testing included CBC 1/3/2023 which has normalized with H&H 12.5 and 38.1, subsequent ferritin at 50.40, iron of 258, TIBC of 438 and 59% saturation.  She is feeling reasonably well except for mild tenderness abdominally postoperatively.  We have discussed that her saturation index appears to be moderately elevated?  And she is also concerned about her blood sugar which is also mildly elevated apparently postprandially.      Plan:  *Discontinue oral iron, reviewed her OTC vitamin which does not contain iron    *AM visit 1 to 2 months, CMP, ferritin, iron profile, hemoglobin A1c and TSH    *Subsequent mammography late March, early April 2023    *Gynecologic follow-up as needed.    *Neurologic follow-up as planned

## 2024-01-08 NOTE — PROGRESS NOTES
Subjective patient feeling reasonably well    REASON FOR FOLLOWUP: History of DCIS left breast 2007                                  History of Present Illness   The patient is now a 48-year-old female seen in January 2022 by GI medicine presenting to [facility] care.  She has a known history of gestational diabetes, IBS diarrhea and been treated with Lotronex successfully.  She was seen by Dr. Blanco 1/18/2022 having moved to [place] in February of this year and with a history of IBS initially treated with Viberzi which was unsuccessful and the patient, thereafter, was treated successfully with Lotronex.  Plans were made for subsequent colonoscopy and, additionally, oncology recommendation was requested as result of a previous history of ductal carcinoma in situ.     Her records concerning this included review by Dr. Deepika Tejada of [facility] on 2/12/2020 with a history of DCIS (ER positive) status post lumpectomy and XRT in 2007 followed by tamoxifen x3 years discontinued 2010.  The patient describes genetic assessment for likely BRCA in 2007 which was negative.  The patient's been followed by yearly mammography and possibly every third year MRI.  She has been clinically stable without recurrence undergoing genetic assessment which was evidently unremarkable and also reviewed and treated for iron deficiency anemia.  Patient studies date from 2/12/2020 with ferritin 29.5, B12 906.5, vitamin D of 30.7, CMP with BUN/creatinine of 13.5 and 0.67, calcium 9.6, normal LFTs, iron of 115, TIBC of 3 1736% saturation, LDH of 142  The patient is initially seen 2/24/2022 at [facility] office and we discussed her history as well as periodic symptoms of left upper extremity tremor, recent ParaGard placement in 2018 for irregular menses, associated iron deficiency and need for additional genetic assessment with extended panel.    The patient subsequent testing included a normal CBC, normal  ferritin, iron profile with 10% saturation, CMP normal.  Diagnostic mammography are compared to 2016 along with ultrasound with no evidence in either modality of malignancy in either breast.    The patient underwent colonoscopy 3/21 with no significant abnormalities, biopsy of the descending colon revealed benign colonic mucosa, rectal biopsy was also negative.    The patient is seen 4/21/2022 with recent genetic assessment just drawn, plans for GYN assessment in the next several months and improvement in her IBS symptoms initially intolerant to Xifaxan but improved with Pamelor and Lotronex.  Her menses, ever, remain somewhat erratic even now.    The patient is next assessed 8/11/2022 with normalization of her CBC as well as iron stores.  Her genetics assessment has been completed with variant of unknown significance detected-NF1.  Patient states that her stamina has improved and though her menses are again erratic and often annoying that she feels relatively well.  She has gynecologic follow-up now scheduled.    This led to review by gynecology and the patient offered endometrial ablation as well as permanent sterilization with laparoscopic salpingectomy, IUD removal.  She underwent these procedures 11/30/2022.  Subsequent findings include benign proliferative endometrium, fragments consist of endometrial polyp, stromal breakdown, no hyperplasia.    Follow-up testing included CBC 1/3/2023 which has normalized with H&H 12.5 and 38.1, subsequent ferritin at 50.40, iron of 258, TIBC of 438 and 59% saturation.  She is feeling reasonably well except for mild tenderness abdominally postoperatively.    We have discussed that her saturation index appears to be moderately elevated?  And she is also concerned about her blood sugar which is also mildly elevated apparently postprandially.      Past Medical History:   Diagnosis Date    Breast cancer     Cancer     breast cancer 2007 DCIS    LEFT    Clotting disorder 2017     Irregular & very heavy flow during menstral cycles. For 36 hours will have to use super plus tampons & change every 1.5 - 2 hours    Gestational diabetes     Heavy periods     Hx of radiation therapy     IBS (irritable bowel syndrome)     with diarrhea    Iron deficiency     IUD (intrauterine device) in place     Low blood pressure     Migraine 2007    On & off throughout the years. Typically once a quarter    Ovarian cyst 1997    Had cyst rupture while in undergrad, went on birth control pills at that time    Pulmonary nodules     Trigger finger of right thumb     s/p steroid injection with hand surgery    Urinary tract infection Feb 2021    First UTI last year    Varicella 1986    Had while in 5th grade        Past Surgical History:   Procedure Laterality Date    BREAST BIOPSY  March 2007    Had DCIS in left breast with surgery to remove the mass. Followed by radiation & 3.5 years of Tamoxifen. Was tested for BRCA 1 & 2 in 2005 with a recent updated tested with Dr. Calderón    BREAST LUMPECTOMY Left 2007    CARDIAC CATHETERIZATION  2001    Table top test. Had issues with fainting. Low blood pressure & was on medication until pregnancy. Haven’t had issues since.    COLONOSCOPY  approx 2011    negative per pt     COLONOSCOPY N/A 03/21/2022    Procedure: COLONOSCOPY to cecum and TI with biopsies;  Surgeon: Tuan Blanco MD;  Location: [facility] ENDOSCOPY;  Service: Gastroenterology;  Laterality: N/A;  pre-change in bowel habits  post-hemorrhoids    D & C HYSTEROSCOPY ENDOMETRIAL ABLATION N/A 11/30/2022    Procedure: DILATATION AND CURETTAGE HYSTEROSCOPY NOVASURE ENDOMETRIAL ABLATION;  Surgeon: Real Posada MD;  Location: [facility] OR;  Service: Obstetrics/Gynecology;  Laterality: N/A;    DIAGNOSTIC LAPAROSCOPY Bilateral 11/30/2022    Procedure: SALPINGECTOMY LAPAROSCOPIC, INTRAUTERINE DEVICE REMOVAL;  Surgeon: Real Posada MD;  Location: [facility] MAIN OR;  Service: Obstetrics/Gynecology;  Laterality: Bilateral;     TUBAL ABDOMINAL LIGATION  11.30.22    TUMOR REMOVAL      right foot during childhood    WISDOM TOOTH EXTRACTION  1996    During undergrad        Current Outpatient Medications on File Prior to Visit   Medication Sig Dispense Refill    alosetron (LOTRONEX) 0.5 MG tablet Take 1 tablet by mouth 2 (Two) Times a Day. 180 tablet 3    multivitamin with minerals tablet tablet Take 1 tablet by mouth Daily.      nortriptyline (PAMELOR) 10 MG capsule Take 1 capsule by mouth Every Night. 90 capsule 3    azithromycin (Zithromax Z-Yvan) 250 MG tablet Take 2 tablets the first day, then 1 tablet daily for 4 days. 6 tablet 0    Rimegepant Sulfate (Nurtec) 75 MG tablet dispersible tablet Take 1 tablet by mouth Daily As Needed (migraine). 8 tablet 5     No current facility-administered medications on file prior to visit.        ALLERGIES:    Allergies   Allergen Reactions    Penicillins Hives        Social History     Socioeconomic History    Marital status:      Spouse name: Max    Number of children: 2    Years of education: College   Tobacco Use    Smoking status: Former     Types: Cigarettes    Smokeless tobacco: Never    Tobacco comments:     social in college    Vaping Use    Vaping Use: Never used   Substance and Sexual Activity    Alcohol use: Yes     Alcohol/week: 7.0 standard drinks of alcohol     Types: 7 Glasses of wine per week     Comment: On average, glass of wine a day    Drug use: Never    Sexual activity: Yes     Partners: Male     Birth control/protection: Tubal ligation        Family History   Adopted: Yes   Problem Relation Age of Onset    Malig Hyperthermia Neg Hx         Review of Systems   Constitutional: Negative.    HENT: Negative.     Eyes: Negative.    Respiratory: Negative.     Cardiovascular: Negative.    Gastrointestinal:  Diarrhea: See history of present illness.   Endocrine: Negative.    Genitourinary:  Positive for menstrual problem.   Musculoskeletal: Negative.    Skin: Negative.   "  Allergic/Immunologic: Negative.    Neurological:  Positive for tremors (Left upper extremity, episodic).   Hematological: Negative.    Psychiatric/Behavioral: Negative.         Objective     Vitals:    01/08/24 1208   BP: 128/81   Pulse: 76   Resp: 18   Temp: 97.8 °F (36.6 °C)   TempSrc: Temporal   SpO2: 99%   Weight: 52.3 kg (115 lb 4.8 oz)   Height: 161.3 cm (63.5\")   PainSc: 0-No pain           1/8/2024    12:08 PM   Current Status   ECOG score 0       Physical Exam  Constitutional:       Appearance: Normal appearance. She is normal weight.   HENT:      Head: Normocephalic and atraumatic.      Nose: Nose normal.      Mouth/Throat:      Mouth: Mucous membranes are moist.      Pharynx: Oropharynx is clear.   Eyes:      Extraocular Movements: Extraocular movements intact.      Conjunctiva/sclera: Conjunctivae normal.      Pupils: Pupils are equal, round, and reactive to light.   Cardiovascular:      Rate and Rhythm: Normal rate and regular rhythm.      Pulses: Normal pulses.      Heart sounds: Normal heart sounds.   Pulmonary:      Effort: Pulmonary effort is normal.      Breath sounds: Normal breath sounds.      Comments: Patient status post left breast lumpectomy, well-healed, radiation tattoos recognized, no additional abnormalities  Abdominal:      General: Bowel sounds are normal.      Palpations: Abdomen is soft.      Comments: Laparoscopic sites, healing   Musculoskeletal:         General: Normal range of motion.      Cervical back: Normal range of motion and neck supple.   Skin:     General: Skin is warm and dry.   Neurological:      General: No focal deficit present.      Mental Status: She is alert and oriented to person, place, and time.   Psychiatric:         Mood and Affect: Mood normal.         Behavior: Behavior normal.           RECENT LABS:  Hematology WBC   Date Value Ref Range Status   01/02/2024 4.44 3.40 - 10.80 10*3/mm3 Final     RBC   Date Value Ref Range Status   01/02/2024 3.96 3.77 - 5.28 " 10*6/mm3 Final     Hemoglobin   Date Value Ref Range Status   01/02/2024 12.6 12.0 - 15.9 g/dL Final     Hematocrit   Date Value Ref Range Status   01/02/2024 38.6 34.0 - 46.6 % Final     Platelets   Date Value Ref Range Status   01/02/2024 215 140 - 450 10*3/mm3 Final          Assessment & Plan     47-year-old female with history of:    *DCIS left breast 2007 status post lumpectomy, radiation therapy and 3 years of tamoxifen.  Patient describes the tumor was ER positive but is unclear about additional testing.  She was seen in consultation 2/24/2022.   subsequent testing included a normal CBC, normal ferritin, iron profile with 10% saturation, CMP normal.  Diagnostic mammography are compared to 2016 along with ultrasound with no evidence in either modality of malignancy in either breast.  Genetic assessment just initiated 4/21/2022 with results- Variant of unknown significance detected-NF1  Plan screening mammography late March, early April 2023    *IBS  The patient underwent colonoscopy 3/21 with no significant abnormalities, biopsy of the descending colon revealed benign colonic mucosa, rectal biopsy was also negative.  Recent improvement on Pamelor Lotronex    *Erratic menses  Gynecologic consultation-subsequent endometrial ablation as well as permanent sterilization with laparoscopic salpingectomy, IUD removal.  She underwent these procedures 11/30/2022.  Subsequent findings include benign proliferative endometrium, fragments consist of endometrial polyp, stromal breakdown, no hyperplasia.      *Iron deficiency anemia  Placed on oral iron after visit 2/24/2022  Reassessment of iron status 8/5/2022 finds normal iron profile, ferritin and CBC, oral iron discontinued  Follow-up testing included CBC 1/3/2023 which has normalized with H&H 12.5 and 38.1, subsequent ferritin at 50.40, iron of 258, TIBC of 438 and 59% saturation  Follow-up testing included CBC 1/3/2023 which has normalized with H&H 12.5 and 38.1,  subsequent ferritin at 50.40, iron of 258, TIBC of 438 and 59% saturation.  She is feeling reasonably well except for mild tenderness abdominally postoperatively.  We have discussed that her saturation index appears to be moderately elevated?  And she is also concerned about her blood sugar which is also mildly elevated apparently postprandially.      Plan:  *Discontinue oral iron, reviewed her OTC vitamin which does not contain iron    *AM visit 1 to 2 months, CMP, ferritin, iron profile, hemoglobin A1c and TSH    *Subsequent mammography late March, early April 2023    *Gynecologic follow-up as needed.    *Neurologic follow-up as planned

## 2024-01-17 ENCOUNTER — OFFICE VISIT (OUTPATIENT)
Dept: NEUROLOGY | Facility: CLINIC | Age: 49
End: 2024-01-17
Payer: COMMERCIAL

## 2024-01-17 VITALS
WEIGHT: 116 LBS | DIASTOLIC BLOOD PRESSURE: 78 MMHG | HEART RATE: 69 BPM | BODY MASS INDEX: 19.81 KG/M2 | HEIGHT: 64 IN | OXYGEN SATURATION: 99 % | SYSTOLIC BLOOD PRESSURE: 122 MMHG

## 2024-01-17 DIAGNOSIS — G25.0 BENIGN ESSENTIAL TREMOR: Primary | ICD-10-CM

## 2024-01-17 PROCEDURE — 99204 OFFICE O/P NEW MOD 45 MIN: CPT | Performed by: PSYCHIATRY & NEUROLOGY

## 2024-01-17 NOTE — PROGRESS NOTES
Chief Complaint   Patient presents with    Tremors       Patient ID: Patti Hogue is a 48 y.o. female.    HPI:   I thank you for referring your patient to see us here in the neurology clinic this afternoon.  As you may know she is a 48-year-old female here for the management of tremor.  The patient says that she initially noticed tremoring of the hands bilaterally more predominant on the left.  She says that initially the tremor was very slight.  She would notice that occasionally when holding her hands in certain positions.  However, she says at this point the tremor appears to be progressing some.  She states that she has noticed it more frequently throughout the day.  Again typically when holding certain positions with her hands.  She shows a video today of her holding a cell phone.  She denies any tremor at rest.  No specific changes of gait.  No changes of facial expression.  She is adopted therefore is unaware of her family history genetically.    The following portions of the patient's history were reviewed and updated as appropriate allergies, current medications, past family history, past medical history, past social history, past surgical history and problem list.    Review of Systems   Neurological:  Positive for tremors. Negative for dizziness, seizures, syncope, facial asymmetry, speech difficulty, weakness, light-headedness, numbness and headaches.   Psychiatric/Behavioral:  Negative for agitation, behavioral problems, confusion, decreased concentration, dysphoric mood, hallucinations, self-injury, sleep disturbance and suicidal ideas. The patient is not nervous/anxious and is not hyperactive.         I have reviewed the review of systems above performed by my medical assistant.      Vitals:    01/17/24 0803   BP: 122/78   Pulse: 69   SpO2: 99%       Neurologic Exam     Mental Status   Oriented to person, place, and time.   Registration: recalls 3 of 3 objects. Follows 3 step commands.   Attention:  normal. Concentration: normal.   Speech: speech is normal   Level of consciousness: alert  Knowledge: consistent with education (No deficits found.).   Normal comprehension.     Cranial Nerves     CN II   Visual fields full to confrontation.     CN III, IV, VI   Pupils are equal, round, and reactive to light.  Extraocular motions are normal.   CN III: no CN III palsy  CN VI: no CN VI palsy  Nystagmus: none   Diplopia: none    CN V   Facial sensation intact.     CN VII   Facial expression full, symmetric.     CN VIII   CN VIII normal.     CN IX, X   CN IX normal.   CN X normal.     CN XI   CN XI normal.     CN XII   CN XII normal.     Motor Exam   Muscle bulk: normal  Right arm tone: normal  Left arm tone: normal  Right leg tone: normal  Left leg tone: normal    Strength   Right neck flexion: 5/5  Left neck flexion: 5/5  Right neck extension: 5/5  Left neck extension: 5/5  Right deltoid: 5/5  Left deltoid: 5/5  Right biceps: 5/5  Left biceps: 5/5  Right triceps: 5/5  Left triceps: 5/5  Right wrist flexion: 5/5  Left wrist flexion: 5/5  Right wrist extension: 5/5  Left wrist extension: 5/5  Right interossei: 5/5  Left interossei: 5/5  Right abdominals: 5/5  Left abdominals: 5/5  Right iliopsoas: 5/5  Left iliopsoas: 5/5  Right quadriceps: 5/5  Left quadriceps: 5/5  Right hamstrin/5  Left hamstrin/5  Right glutei: 5/5  Left glutei: 5/5  Right anterior tibial: 5/5  Left anterior tibial: 5/5  Right posterior tibial: 5/5  Left posterior tibial: 5/5  Right peroneal: 5/5  Left peroneal: 5/5  Right gastroc: 5/5  Left gastroc: 5/5    Sensory Exam   Light touch normal.   Vibration normal.   Proprioception normal.   Pinprick normal.     Gait, Coordination, and Reflexes     Gait  Gait: normal    Coordination   Romberg: negative    Tremor   Resting tremor: absent  Intention tremor: present    Reflexes   Right brachioradialis: 2+  Left brachioradialis: 2+  Right biceps: 2+  Left biceps: 2+  Right triceps: 2+  Left triceps:  2+  Right patellar: 2+  Left patellar: 2+  Right achilles: 2+  Left achilles: 2+  Right : 2+  Left : 2+Station is normal.       Physical Exam  Vitals reviewed.   Constitutional:       General: She is not in acute distress.     Appearance: She is well-developed.   HENT:      Head: Normocephalic and atraumatic.   Eyes:      Extraocular Movements: EOM normal.      Pupils: Pupils are equal, round, and reactive to light.   Cardiovascular:      Rate and Rhythm: Normal rate and regular rhythm.      Heart sounds: Normal heart sounds.   Pulmonary:      Effort: Pulmonary effort is normal. No respiratory distress.      Breath sounds: Normal breath sounds.   Abdominal:      General: Bowel sounds are normal. There is no distension.      Palpations: Abdomen is soft.      Tenderness: There is no abdominal tenderness.   Musculoskeletal:         General: No deformity.      Cervical back: Normal range of motion.   Skin:     General: Skin is warm.      Findings: No rash.   Neurological:      Mental Status: She is oriented to person, place, and time.      Coordination: Romberg Test normal.      Gait: Gait is intact.      Deep Tendon Reflexes:      Reflex Scores:       Tricep reflexes are 2+ on the right side and 2+ on the left side.       Bicep reflexes are 2+ on the right side and 2+ on the left side.       Brachioradialis reflexes are 2+ on the right side and 2+ on the left side.       Patellar reflexes are 2+ on the right side and 2+ on the left side.       Achilles reflexes are 2+ on the right side and 2+ on the left side.  Psychiatric:         Speech: Speech normal.         Judgment: Judgment normal.         Procedures    Assessment/Plan: We have discussed benign essential tremor and its entirety today.  Currently we feel that there is not a need to treat the tremor medically seeing how it has not disrupted her quality of life overall.  Therefore we we will see her back again in approximately 6 months to review her  symptoms and discuss further care if needed at that time.  A total of 45 minutes was spent face-to-face with the patient today.  Of that greater than 50% of this time was spent discussing signs and symptoms of benign essential tremor, patient education, plan of care and prognosis.         Diagnoses and all orders for this visit:    1. Benign essential tremor (Primary)           Maximilian Reyna II, MD

## 2024-01-19 ENCOUNTER — PATIENT ROUNDING (BHMG ONLY) (OUTPATIENT)
Dept: NEUROLOGY | Facility: CLINIC | Age: 49
End: 2024-01-19
Payer: COMMERCIAL

## 2024-01-19 ENCOUNTER — OFFICE VISIT (OUTPATIENT)
Dept: OBSTETRICS AND GYNECOLOGY | Age: 49
End: 2024-01-19
Payer: COMMERCIAL

## 2024-01-19 VITALS
SYSTOLIC BLOOD PRESSURE: 108 MMHG | DIASTOLIC BLOOD PRESSURE: 74 MMHG | BODY MASS INDEX: 19.46 KG/M2 | WEIGHT: 114 LBS | HEIGHT: 64 IN

## 2024-01-19 DIAGNOSIS — Z01.419 WELL FEMALE EXAM WITH ROUTINE GYNECOLOGICAL EXAM: ICD-10-CM

## 2024-01-19 DIAGNOSIS — G43.829 MENSTRUAL MIGRAINE WITHOUT STATUS MIGRAINOSUS, NOT INTRACTABLE: ICD-10-CM

## 2024-01-19 DIAGNOSIS — Z01.419 ENCOUNTER FOR GYNECOLOGICAL EXAMINATION WITHOUT ABNORMAL FINDING: Primary | ICD-10-CM

## 2024-01-19 DIAGNOSIS — Z12.31 SCREENING MAMMOGRAM FOR BREAST CANCER: ICD-10-CM

## 2024-01-19 PROBLEM — N92.1 MENORRHAGIA WITH IRREGULAR CYCLE: Status: RESOLVED | Noted: 2022-10-18 | Resolved: 2024-01-19

## 2024-01-19 RX ORDER — SUMATRIPTAN 25 MG/1
TABLET, FILM COATED ORAL
Qty: 9 TABLET | Refills: 11 | Status: SHIPPED | OUTPATIENT
Start: 2024-01-19

## 2024-01-19 NOTE — PROGRESS NOTES
Subjective     Chief Complaint   Patient presents with    Gynecologic Exam     AC. Pt had MG 4-       History of Present Illness    Patti Hogue is a 48 y.o.  who presents for annual exam.  Patient is happy with the results of her ablation.  She only has light occasional bleeding.  She recently just saw Dr. Reyna and was diagnosed with essential tremor.  She has a history of left-sided DCIS.  She gets yearly mammograms.  Her genetic testing was negative in the past.  Her 2 sons are ages 7 and 11.  They go to [employer].  Her menses are irregular, lasting q1-2 months   1 day , dysmenorrhea mild, occurring first 1-2 days of flow   Obstetric History:  OB History          3    Para   2    Term   2            AB   1    Living   2         SAB   1    IAB        Ectopic        Molar        Multiple        Live Births   2               Menstrual History:     No LMP recorded. Patient has had an ablation.         Current contraception:  BS   History of abnormal Pap smear: no  Received Gardasil immunization: no  Perform regular self breast exam : yes  Family history of uterine or ovarian cancer: no  Pt Adopted - her genetic test neg   Family History of colon cancer: no  Family history of breast cancer: no    Mammogram: up to date.  Colonoscopy: up to date. 3/2022   DEXA: not indicated.    Exercise: no   Calcium/Vitamin D: adequate intake and uses supplements    The following portions of the patient's history were reviewed and updated as appropriate: allergies, current medications, past family history, past medical history, past social history, past surgical history, and problem list.    Review of Systems    Review of Systems   Constitutional: Negative for fatigue.   Respiratory: Negative for shortness of breath.    Gastrointestinal: Negative for abdominal pain.   Genitourinary: Negative for dysuria.   Neurological: Negative for headaches.   Psychiatric/Behavioral: Negative for dysphoric mood.  "    Objective   Physical Exam    /74   Ht 161.3 cm (63.5\")   Wt 51.7 kg (114 lb)   BMI 19.88 kg/m²     General:   alert, appears stated age and cooperative   Neck: thyroid normal to palpation   Heart: regular rate and rhythm   Lungs: clear to auscultation bilaterally   Abdomen: soft, non-tender, without masses or organomegaly   Breast: inspection negative, no nipple discharge or bleeding, no masses or nodularity palpable   Vulva: normal, Bartholin's, Urethra, [place]'s normal   Vagina: normal mucosa, normal discharge   Cervix: no cervical motion tenderness and no lesions   Uterus: non-tender, normal shape and consistency   Adnexa: no mass, fullness, tenderness   Rectal: not indicated     Assessment & Plan   Diagnoses and all orders for this visit:    1. Encounter for gynecological examination without abnormal finding (Primary)    2. Menstrual migraine without status migrainosus, not intractable    3. Well female exam with routine gynecological exam    4. Screening mammogram for breast cancer  -     Mammo Screening Digital Tomosynthesis Bilateral With CAD; Future    Other orders  -     SUMAtriptan (Imitrex) 25 MG tablet; Take one tablet at onset of headache. May repeat dose one time in 2 hours if headache not relieved.  Dispense: 9 tablet; Refill: 11    Pap smear is up-to-date  We discussed menstrual migraines.  Patient would like to try medication.  Imitrex was given today.  We discussed how the medication works and risk and benefits.  Mammogram is ordered.    All questions answered.  Breast self exam technique reviewed and patient encouraged to perform self-exam monthly.  Discussed healthy lifestyle modifications.  Recommended 30 minutes of aerobic exercise five times per week.  Discussed calcium needs to prevent osteoporosis.                     "

## 2024-02-05 ENCOUNTER — LAB (OUTPATIENT)
Dept: LAB | Facility: HOSPITAL | Age: 49
End: 2024-02-05
Payer: COMMERCIAL

## 2024-02-05 DIAGNOSIS — D50.0 IRON DEFICIENCY ANEMIA DUE TO CHRONIC BLOOD LOSS: ICD-10-CM

## 2024-02-05 DIAGNOSIS — Z86.000 HISTORY OF DUCTAL CARCINOMA IN SITU (DCIS) OF BREAST: ICD-10-CM

## 2024-02-05 LAB
ALBUMIN SERPL-MCNC: 4.4 G/DL (ref 3.5–5.2)
ALBUMIN/GLOB SERPL: 2.1 G/DL
ALP SERPL-CCNC: 33 U/L (ref 39–117)
ALT SERPL W P-5'-P-CCNC: 9 U/L (ref 1–33)
ANION GAP SERPL CALCULATED.3IONS-SCNC: 8.7 MMOL/L (ref 5–15)
AST SERPL-CCNC: 16 U/L (ref 1–32)
BASOPHILS # BLD AUTO: 0.02 10*3/MM3 (ref 0–0.2)
BASOPHILS NFR BLD AUTO: 0.5 % (ref 0–1.5)
BILIRUB SERPL-MCNC: 0.5 MG/DL (ref 0–1.2)
BUN SERPL-MCNC: 13 MG/DL (ref 6–20)
BUN/CREAT SERPL: 16.5 (ref 7–25)
CALCIUM SPEC-SCNC: 9.2 MG/DL (ref 8.6–10.5)
CHLORIDE SERPL-SCNC: 104 MMOL/L (ref 98–107)
CO2 SERPL-SCNC: 27.3 MMOL/L (ref 22–29)
CREAT SERPL-MCNC: 0.79 MG/DL (ref 0.57–1)
DEPRECATED RDW RBC AUTO: 43.3 FL (ref 37–54)
EGFRCR SERPLBLD CKD-EPI 2021: 93 ML/MIN/1.73
EOSINOPHIL # BLD AUTO: 0.07 10*3/MM3 (ref 0–0.4)
EOSINOPHIL NFR BLD AUTO: 1.7 % (ref 0.3–6.2)
ERYTHROCYTE [DISTWIDTH] IN BLOOD BY AUTOMATED COUNT: 12.3 % (ref 12.3–15.4)
FERRITIN SERPL-MCNC: 63.5 NG/ML (ref 13–150)
GLOBULIN UR ELPH-MCNC: 2.1 GM/DL
GLUCOSE SERPL-MCNC: 107 MG/DL (ref 65–99)
HBA1C MFR BLD: 5.1 % (ref 4.8–5.6)
HCT VFR BLD AUTO: 40.2 % (ref 34–46.6)
HGB BLD-MCNC: 13.5 G/DL (ref 12–15.9)
IMM GRANULOCYTES # BLD AUTO: 0.02 10*3/MM3 (ref 0–0.05)
IMM GRANULOCYTES NFR BLD AUTO: 0.5 % (ref 0–0.5)
IRON 24H UR-MRATE: 128 MCG/DL (ref 37–145)
IRON SATN MFR SERPL: 41 % (ref 20–50)
LYMPHOCYTES # BLD AUTO: 1.38 10*3/MM3 (ref 0.7–3.1)
LYMPHOCYTES NFR BLD AUTO: 34.4 % (ref 19.6–45.3)
MCH RBC QN AUTO: 32.3 PG (ref 26.6–33)
MCHC RBC AUTO-ENTMCNC: 33.6 G/DL (ref 31.5–35.7)
MCV RBC AUTO: 96.2 FL (ref 79–97)
MONOCYTES # BLD AUTO: 0.25 10*3/MM3 (ref 0.1–0.9)
MONOCYTES NFR BLD AUTO: 6.2 % (ref 5–12)
NEUTROPHILS NFR BLD AUTO: 2.27 10*3/MM3 (ref 1.7–7)
NEUTROPHILS NFR BLD AUTO: 56.7 % (ref 42.7–76)
NRBC BLD AUTO-RTO: 0 /100 WBC (ref 0–0.2)
PLATELET # BLD AUTO: 193 10*3/MM3 (ref 140–450)
PMV BLD AUTO: 8.7 FL (ref 6–12)
POTASSIUM SERPL-SCNC: 4.4 MMOL/L (ref 3.5–5.2)
PROT SERPL-MCNC: 6.5 G/DL (ref 6–8.5)
RBC # BLD AUTO: 4.18 10*6/MM3 (ref 3.77–5.28)
SODIUM SERPL-SCNC: 140 MMOL/L (ref 136–145)
TIBC SERPL-MCNC: 311 MCG/DL (ref 298–536)
TRANSFERRIN SERPL-MCNC: 222 MG/DL (ref 200–360)
TSH SERPL DL<=0.05 MIU/L-ACNC: 1.46 UIU/ML (ref 0.27–4.2)
WBC NRBC COR # BLD AUTO: 4.01 10*3/MM3 (ref 3.4–10.8)

## 2024-02-05 PROCEDURE — 82728 ASSAY OF FERRITIN: CPT

## 2024-02-05 PROCEDURE — 84466 ASSAY OF TRANSFERRIN: CPT

## 2024-02-05 PROCEDURE — 36415 COLL VENOUS BLD VENIPUNCTURE: CPT

## 2024-02-05 PROCEDURE — 83540 ASSAY OF IRON: CPT

## 2024-02-05 PROCEDURE — 80050 GENERAL HEALTH PANEL: CPT

## 2024-02-05 PROCEDURE — 83036 HEMOGLOBIN GLYCOSYLATED A1C: CPT | Performed by: INTERNAL MEDICINE

## 2024-02-19 ENCOUNTER — OFFICE VISIT (OUTPATIENT)
Dept: ONCOLOGY | Facility: CLINIC | Age: 49
End: 2024-02-19
Payer: COMMERCIAL

## 2024-02-19 VITALS
RESPIRATION RATE: 18 BRPM | OXYGEN SATURATION: 100 % | DIASTOLIC BLOOD PRESSURE: 78 MMHG | WEIGHT: 116.1 LBS | HEART RATE: 74 BPM | SYSTOLIC BLOOD PRESSURE: 118 MMHG | HEIGHT: 64 IN | BODY MASS INDEX: 19.82 KG/M2 | TEMPERATURE: 98.4 F

## 2024-02-19 DIAGNOSIS — D05.12 BREAST NEOPLASM, TIS (DCIS), LEFT: Primary | ICD-10-CM

## 2024-02-19 DIAGNOSIS — Z86.000 HISTORY OF DUCTAL CARCINOMA IN SITU (DCIS) OF BREAST: ICD-10-CM

## 2024-02-19 PROCEDURE — 99213 OFFICE O/P EST LOW 20 MIN: CPT | Performed by: INTERNAL MEDICINE

## 2024-02-19 NOTE — LETTER
February 19, 2024     Chente Mathews DO  [address]  [place] [id number]    Patient: Patti Hogue   YOB: 1975   Date of Visit: 2/19/2024     Dear Chente Mathews DO:       Thank you for referring Patti Hogue to me for evaluation. Below are the relevant portions of my assessment and plan of care.    If you have questions, please do not hesitate to call me. I look forward to following Patti along with you.         Sincerely,        Benny Calderón MD        CC: MD Real Whitley II, MD Kommor, Michael D., MD  02/19/24 7245  Sign when Signing Visit        REASON FOR FOLLOWUP: History of DCIS left breast 2007                                  History of Present Illness   The patient is now a 48-year-old female seen in January 2022 by GI medicine presenting to [facility].  She has a known history of gestational diabetes, IBS diarrhea and been treated with Lotronex successfully.  She was seen by Dr. lBanco 1/18/2022 having moved to [place] in February of this year and with a history of IBS initially treated with Viberzi which was unsuccessful and the patient, thereafter, was treated successfully with Lotronex.  Plans were made for subsequent colonoscopy and, additionally, oncology recommendation was requested as result of a previous history of ductal carcinoma in situ.     Her records concerning this included review by Dr. Deepika Tejada of [facility] on 2/12/2020 with a history of DCIS (ER positive) status post lumpectomy and XRT in 2007 followed by tamoxifen x3 years discontinued 2010.  The patient describes genetic assessment for likely BRCA in 2007 which was negative.  The patient's been followed by yearly mammography and possibly every third year MRI.  She has been clinically stable without recurrence undergoing genetic assessment which was evidently unremarkable and also reviewed and treated for iron deficiency anemia.  Patient  studies date from 2/12/2020 with ferritin 29.5, B12 906.5, vitamin D of 30.7, CMP with BUN/creatinine of 13.5 and 0.67, calcium 9.6, normal LFTs, iron of 115, TIBC of 3 1736% saturation, LDH of 142  The patient is initially seen 2/24/2022 at [facility] office and we discussed her history as well as periodic symptoms of left upper extremity tremor, recent ParaGard placement in 2018 for irregular menses, associated iron deficiency and need for additional genetic assessment with extended panel.    The patient subsequent testing included a normal CBC, normal ferritin, iron profile with 10% saturation, CMP normal.  Diagnostic mammography are compared to 2016 along with ultrasound with no evidence in either modality of malignancy in either breast.    The patient underwent colonoscopy 3/21 with no significant abnormalities, biopsy of the descending colon revealed benign colonic mucosa, rectal biopsy was also negative.    The patient is seen 4/21/2022 with recent genetic assessment just drawn, plans for GYN assessment in the next several months and improvement in her IBS symptoms initially intolerant to Xifaxan but improved with Pamelor and Lotronex.  Her menses, ever, remain somewhat erratic even now.    The patient is next assessed 8/11/2022 with normalization of her CBC as well as iron stores.  Her genetics assessment has been completed with variant of unknown significance detected-NF1.  Patient states that her stamina has improved and though her menses are again erratic and often annoying that she feels relatively well.  She has gynecologic follow-up now scheduled.    This led to review by gynecology and the patient offered endometrial ablation as well as permanent sterilization with laparoscopic salpingectomy, IUD removal.  She underwent these procedures 11/30/2022.  Subsequent findings include benign proliferative endometrium, fragments consist of endometrial polyp, stromal breakdown, no hyperplasia.    Follow-up testing  included CBC 1/3/2023 which has normalized with H&H 12.5 and 38.1, subsequent ferritin at 50.40, iron of 258, TIBC of 438 and 59% saturation.  She is feeling reasonably well except for mild tenderness abdominally postoperatively.    The patient is next assessed 2/19/2024 with follow-up CBC 2/5/2024 normal with H&H of 13.5 and 40.2, white count 4010, platelet count of 193,000, normal CMP, ferritin of 63.5, iron profile with 41% saturation, hemoglobin A1c of 5.1 and TSH of 1.46.  She has been assessed by neurology 1/17/2024 with benign essential tremor and also OB/GYN with a well examination.  The patient is reviewed formally 2/19/2024 and is felt to be doing extraordinarily well.        Past Medical History:   Diagnosis Date   • Benign essential tremor    • Breast cancer    • Cancer     breast cancer 2007 DCIS    LEFT   • Clotting disorder 2017    Irregular & very heavy flow during menstral cycles. For 36 hours will have to use super plus tampons & change every 1.5 - 2 hours   • Gestational diabetes    • Heavy periods    • Hx of radiation therapy    • IBS (irritable bowel syndrome)     with diarrhea   • Iron deficiency    • IUD (intrauterine device) in place    • Low blood pressure    • Menorrhagia with irregular cycle     Added automatically from request for surgery [id number]   • Migraine 2007    On & off throughout the years. Typically once a quarter   • Ovarian cyst 1997    Had cyst rupture while in undergrad, went on birth control pills at that time   • Pulmonary nodules    • Trigger finger of right thumb     s/p steroid injection with hand surgery   • Urinary tract infection Feb 2021    First UTI last year   • Varicella 1986    Had while in 5th grade        Past Surgical History:   Procedure Laterality Date   • BREAST BIOPSY  March 2007    Had DCIS in left breast with surgery to remove the mass. Followed by radiation & 3.5 years of Tamoxifen. Was tested for BRCA 1 & 2 in 2005 with a recent updated tested with   Kaitlynn   • BREAST LUMPECTOMY Left 2007   • CARDIAC CATHETERIZATION  2001    Table top test. Had issues with fainting. Low blood pressure & was on medication until pregnancy. Haven’t had issues since.   • COLONOSCOPY  approx 2011    negative per pt    • COLONOSCOPY N/A 03/21/2022    Procedure: COLONOSCOPY to cecum and TI with biopsies;  Surgeon: Tuan Blanco MD;  Location: [facility] ENDOSCOPY;  Service: Gastroenterology;  Laterality: N/A;  pre-change in bowel habits  post-hemorrhoids   • D & C HYSTEROSCOPY ENDOMETRIAL ABLATION N/A 11/30/2022    Procedure: DILATATION AND CURETTAGE HYSTEROSCOPY NOVASURE ENDOMETRIAL ABLATION;  Surgeon: Real Posada MD;  Location: [facility] MAIN OR;  Service: Obstetrics/Gynecology;  Laterality: N/A;   • DIAGNOSTIC LAPAROSCOPY Bilateral 11/30/2022    Procedure: SALPINGECTOMY LAPAROSCOPIC, INTRAUTERINE DEVICE REMOVAL;  Surgeon: Real Posada MD;  Location: [facility] MAIN OR;  Service: Obstetrics/Gynecology;  Laterality: Bilateral;   • TUBAL ABDOMINAL LIGATION  11.30.22   • TUMOR REMOVAL      right foot during childhood   • WISDOM TOOTH EXTRACTION  1996    During undergrad        Current Outpatient Medications on File Prior to Visit   Medication Sig Dispense Refill   • alosetron (LOTRONEX) 0.5 MG tablet Take 1 tablet by mouth 2 (Two) Times a Day. 180 tablet 3   • multivitamin with minerals tablet tablet Take 1 tablet by mouth Daily.     • nortriptyline (PAMELOR) 10 MG capsule Take 1 capsule by mouth Every Night. 90 capsule 3   • SUMAtriptan (Imitrex) 25 MG tablet Take one tablet at onset of headache. May repeat dose one time in 2 hours if headache not relieved. 9 tablet 11     No current facility-administered medications on file prior to visit.        ALLERGIES:    Allergies   Allergen Reactions   • Penicillins Hives        Social History     Socioeconomic History   • Marital status:      Spouse name: Max   • Number of children: 2   • Years of education: College   Tobacco Use   •  "Smoking status: Former     Types: Cigarettes   • Smokeless tobacco: Never   • Tobacco comments:     social in college    Vaping Use   • Vaping Use: Never used   Substance and Sexual Activity   • Alcohol use: Yes     Alcohol/week: 7.0 standard drinks of alcohol     Types: 7 Glasses of wine per week     Comment: On average, glass of wine a day   • Drug use: Never   • Sexual activity: Yes     Partners: Male     Birth control/protection: Tubal ligation        Family History   Adopted: Yes   Problem Relation Age of Onset   • Malig Hyperthermia Neg Hx         Review of Systems   Constitutional: Negative.    HENT: Negative.     Eyes: Negative.    Respiratory: Negative.     Cardiovascular: Negative.    Gastrointestinal:  Diarrhea: See history of present illness.   Endocrine: Negative.    Genitourinary:  Positive for menstrual problem.   Musculoskeletal: Negative.    Skin: Negative.    Allergic/Immunologic: Negative.    Neurological:  Positive for tremors (Left upper extremity, episodic).   Hematological: Negative.    Psychiatric/Behavioral: Negative.         Objective    Vitals:    02/19/24 1633   BP: 118/78   Pulse: 74   Resp: 18   Temp: 98.4 °F (36.9 °C)   TempSrc: Temporal   SpO2: 100%   Weight: 52.7 kg (116 lb 1.6 oz)   Height: 161.3 cm (63.5\")   PainSc: 0-No pain           2/19/2024     4:33 PM   Current Status   ECOG score 0       Physical Exam  Constitutional:       Appearance: Normal appearance. She is normal weight.   HENT:      Head: Normocephalic and atraumatic.      Nose: Nose normal.      Mouth/Throat:      Mouth: Mucous membranes are moist.      Pharynx: Oropharynx is clear.   Eyes:      Extraocular Movements: Extraocular movements intact.      Conjunctiva/sclera: Conjunctivae normal.      Pupils: Pupils are equal, round, and reactive to light.   Cardiovascular:      Rate and Rhythm: Normal rate and regular rhythm.      Pulses: Normal pulses.      Heart sounds: Normal heart sounds.   Pulmonary:      Effort: " Pulmonary effort is normal.      Breath sounds: Normal breath sounds.      Comments: Patient status post left breast lumpectomy, well-healed, radiation tattoos recognized, no additional abnormalities  Abdominal:      General: Bowel sounds are normal.      Palpations: Abdomen is soft.      Comments: Laparoscopic sites, healing   Musculoskeletal:         General: Normal range of motion.      Cervical back: Normal range of motion and neck supple.   Skin:     General: Skin is warm and dry.   Neurological:      General: No focal deficit present.      Mental Status: She is alert and oriented to person, place, and time.      Comments: Essential tremor noted   Psychiatric:         Mood and Affect: Mood normal.         Behavior: Behavior normal.           RECENT LABS:  Hematology WBC   Date Value Ref Range Status   02/05/2024 4.01 3.40 - 10.80 10*3/mm3 Final     RBC   Date Value Ref Range Status   02/05/2024 4.18 3.77 - 5.28 10*6/mm3 Final     Hemoglobin   Date Value Ref Range Status   02/05/2024 13.5 12.0 - 15.9 g/dL Final     Hematocrit   Date Value Ref Range Status   02/05/2024 40.2 34.0 - 46.6 % Final     Platelets   Date Value Ref Range Status   02/05/2024 193 140 - 450 10*3/mm3 Final          Assessment & Plan    47-year-old female with history of:    *DCIS left breast 2007 status post lumpectomy, radiation therapy and 3 years of tamoxifen.  Patient describes the tumor was ER positive but is unclear about additional testing.  She was seen in consultation 2/24/2022.   subsequent testing included a normal CBC, normal ferritin, iron profile with 10% saturation, CMP normal.  Diagnostic mammography are compared to 2016 along with ultrasound with no evidence in either modality of malignancy in either breast.  Genetic assessment just initiated 4/21/2022 with results- Variant of unknown significance detected-NF1  Plan screening mammography late March, early April 2023  The patient is now scheduled for subsequent mammography  through her OB/GYN.    *IBS  The patient underwent colonoscopy 3/21 with no significant abnormalities, biopsy of the descending colon revealed benign colonic mucosa, rectal biopsy was also negative.  Recent improvement on Pamelor Lotronex  Follow-up with GI planned    *Erratic menses  Gynecologic consultation-subsequent endometrial ablation as well as permanent sterilization with laparoscopic salpingectomy, IUD removal.  She underwent these procedures 11/30/2022.  Subsequent findings include benign proliferative endometrium, fragments consist of endometrial polyp, stromal breakdown, no hyperplasia.  Established follow-up now with OB/GYN.      *Iron deficiency anemia  Placed on oral iron after visit 2/24/2022  Reassessment of iron status 8/5/2022 finds normal iron profile, ferritin and CBC, oral iron discontinued  Follow-up testing included CBC 1/3/2023 which has normalized with H&H 12.5 and 38.1, subsequent ferritin at 50.40, iron of 258, TIBC of 438 and 59% saturation  Reassessed 2/19/2024 without evidence of iron deficiency      Plan:  *At this point the patient can be discharged from [facility] office.  She has follow-up with both primary care, OB/GYN, neurology and GI.  *We will be available at any point and appreciate the opportunity to work with all involved in her care.

## 2024-02-19 NOTE — PROGRESS NOTES
Subjective patient feeling reasonably well    REASON FOR FOLLOWUP: History of DCIS left breast 2007                                  History of Present Illness   The patient is now a 48-year-old female seen in January 2022 by GI medicine presenting to [facility] care.  She has a known history of gestational diabetes, IBS diarrhea and been treated with Lotronex successfully.  She was seen by Dr. Blanco 1/18/2022 having moved to [place] in February of this year and with a history of IBS initially treated with Viberzi which was unsuccessful and the patient, thereafter, was treated successfully with Lotronex.  Plans were made for subsequent colonoscopy and, additionally, oncology recommendation was requested as result of a previous history of ductal carcinoma in situ.     Her records concerning this included review by Dr. Deepika Tejada of [facility] on 2/12/2020 with a history of DCIS (ER positive) status post lumpectomy and XRT in 2007 followed by tamoxifen x3 years discontinued 2010.  The patient describes genetic assessment for likely BRCA in 2007 which was negative.  The patient's been followed by yearly mammography and possibly every third year MRI.  She has been clinically stable without recurrence undergoing genetic assessment which was evidently unremarkable and also reviewed and treated for iron deficiency anemia.  Patient studies date from 2/12/2020 with ferritin 29.5, B12 906.5, vitamin D of 30.7, CMP with BUN/creatinine of 13.5 and 0.67, calcium 9.6, normal LFTs, iron of 115, TIBC of 3 1736% saturation, LDH of 142  The patient is initially seen 2/24/2022 at [facility] office and we discussed her history as well as periodic symptoms of left upper extremity tremor, recent ParaGard placement in 2018 for irregular menses, associated iron deficiency and need for additional genetic assessment with extended panel.    The patient subsequent testing included a normal CBC, normal  ferritin, iron profile with 10% saturation, CMP normal.  Diagnostic mammography are compared to 2016 along with ultrasound with no evidence in either modality of malignancy in either breast.    The patient underwent colonoscopy 3/21 with no significant abnormalities, biopsy of the descending colon revealed benign colonic mucosa, rectal biopsy was also negative.    The patient is seen 4/21/2022 with recent genetic assessment just drawn, plans for GYN assessment in the next several months and improvement in her IBS symptoms initially intolerant to Xifaxan but improved with Pamelor and Lotronex.  Her menses, ever, remain somewhat erratic even now.    The patient is next assessed 8/11/2022 with normalization of her CBC as well as iron stores.  Her genetics assessment has been completed with variant of unknown significance detected-NF1.  Patient states that her stamina has improved and though her menses are again erratic and often annoying that she feels relatively well.  She has gynecologic follow-up now scheduled.    This led to review by gynecology and the patient offered endometrial ablation as well as permanent sterilization with laparoscopic salpingectomy, IUD removal.  She underwent these procedures 11/30/2022.  Subsequent findings include benign proliferative endometrium, fragments consist of endometrial polyp, stromal breakdown, no hyperplasia.    Follow-up testing included CBC 1/3/2023 which has normalized with H&H 12.5 and 38.1, subsequent ferritin at 50.40, iron of 258, TIBC of 438 and 59% saturation.  She is feeling reasonably well except for mild tenderness abdominally postoperatively.    We have discussed that her saturation index appears to be moderately elevated?  And she is also concerned about her blood sugar which is also mildly elevated apparently postprandially.      Past Medical History:   Diagnosis Date    Benign essential tremor     Breast cancer     Cancer     breast cancer 2007 DCIS    LEFT     Clotting disorder 2017    Irregular & very heavy flow during menstral cycles. For 36 hours will have to use super plus tampons & change every 1.5 - 2 hours    Gestational diabetes     Heavy periods     Hx of radiation therapy     IBS (irritable bowel syndrome)     with diarrhea    Iron deficiency     IUD (intrauterine device) in place     Low blood pressure     Menorrhagia with irregular cycle     Added automatically from request for surgery [id number]    Migraine 2007    On & off throughout the years. Typically once a quarter    Ovarian cyst 1997    Had cyst rupture while in undergrad, went on birth control pills at that time    Pulmonary nodules     Trigger finger of right thumb     s/p steroid injection with hand surgery    Urinary tract infection Feb 2021    First UTI last year    Varicella 1986    Had while in 5th grade        Past Surgical History:   Procedure Laterality Date    BREAST BIOPSY  March 2007    Had DCIS in left breast with surgery to remove the mass. Followed by radiation & 3.5 years of Tamoxifen. Was tested for BRCA 1 & 2 in 2005 with a recent updated tested with Dr. Calderón    BREAST LUMPECTOMY Left 2007    CARDIAC CATHETERIZATION  2001    Table top test. Had issues with fainting. Low blood pressure & was on medication until pregnancy. Haven’t had issues since.    COLONOSCOPY  approx 2011    negative per pt     COLONOSCOPY N/A 03/21/2022    Procedure: COLONOSCOPY to cecum and TI with biopsies;  Surgeon: Tuan Blanco MD;  Location: [facility] ENDOSCOPY;  Service: Gastroenterology;  Laterality: N/A;  pre-change in bowel habits  post-hemorrhoids    D & C HYSTEROSCOPY ENDOMETRIAL ABLATION N/A 11/30/2022    Procedure: DILATATION AND CURETTAGE HYSTEROSCOPY NOVASURE ENDOMETRIAL ABLATION;  Surgeon: Real Posada MD;  Location: [facility] MAIN OR;  Service: Obstetrics/Gynecology;  Laterality: N/A;    DIAGNOSTIC LAPAROSCOPY Bilateral 11/30/2022    Procedure: SALPINGECTOMY LAPAROSCOPIC, INTRAUTERINE DEVICE  REMOVAL;  Surgeon: Real Posada MD;  Location: [facility] OR;  Service: Obstetrics/Gynecology;  Laterality: Bilateral;    TUBAL ABDOMINAL LIGATION  11.30.22    TUMOR REMOVAL      right foot during childhood    WISDOM TOOTH EXTRACTION  1996    During undergrad        Current Outpatient Medications on File Prior to Visit   Medication Sig Dispense Refill    alosetron (LOTRONEX) 0.5 MG tablet Take 1 tablet by mouth 2 (Two) Times a Day. 180 tablet 3    multivitamin with minerals tablet tablet Take 1 tablet by mouth Daily.      nortriptyline (PAMELOR) 10 MG capsule Take 1 capsule by mouth Every Night. 90 capsule 3    SUMAtriptan (Imitrex) 25 MG tablet Take one tablet at onset of headache. May repeat dose one time in 2 hours if headache not relieved. 9 tablet 11     No current facility-administered medications on file prior to visit.        ALLERGIES:    Allergies   Allergen Reactions    Penicillins Hives        Social History     Socioeconomic History    Marital status:      Spouse name: Max    Number of children: 2    Years of education: College   Tobacco Use    Smoking status: Former     Types: Cigarettes    Smokeless tobacco: Never    Tobacco comments:     social in college    Vaping Use    Vaping Use: Never used   Substance and Sexual Activity    Alcohol use: Yes     Alcohol/week: 7.0 standard drinks of alcohol     Types: 7 Glasses of wine per week     Comment: On average, glass of wine a day    Drug use: Never    Sexual activity: Yes     Partners: Male     Birth control/protection: Tubal ligation        Family History   Adopted: Yes   Problem Relation Age of Onset    Malig Hyperthermia Neg Hx         Review of Systems   Constitutional: Negative.    HENT: Negative.     Eyes: Negative.    Respiratory: Negative.     Cardiovascular: Negative.    Gastrointestinal:  Diarrhea: See history of present illness.   Endocrine: Negative.    Genitourinary:  Positive for menstrual problem.   Musculoskeletal: Negative.     Skin: Negative.    Allergic/Immunologic: Negative.    Neurological:  Positive for tremors (Left upper extremity, episodic).   Hematological: Negative.    Psychiatric/Behavioral: Negative.         Objective     There were no vitals filed for this visit.          1/8/2024    12:08 PM   Current Status   ECOG score 0       Physical Exam  Constitutional:       Appearance: Normal appearance. She is normal weight.   HENT:      Head: Normocephalic and atraumatic.      Nose: Nose normal.      Mouth/Throat:      Mouth: Mucous membranes are moist.      Pharynx: Oropharynx is clear.   Eyes:      Extraocular Movements: Extraocular movements intact.      Conjunctiva/sclera: Conjunctivae normal.      Pupils: Pupils are equal, round, and reactive to light.   Cardiovascular:      Rate and Rhythm: Normal rate and regular rhythm.      Pulses: Normal pulses.      Heart sounds: Normal heart sounds.   Pulmonary:      Effort: Pulmonary effort is normal.      Breath sounds: Normal breath sounds.      Comments: Patient status post left breast lumpectomy, well-healed, radiation tattoos recognized, no additional abnormalities  Abdominal:      General: Bowel sounds are normal.      Palpations: Abdomen is soft.      Comments: Laparoscopic sites, healing   Musculoskeletal:         General: Normal range of motion.      Cervical back: Normal range of motion and neck supple.   Skin:     General: Skin is warm and dry.   Neurological:      General: No focal deficit present.      Mental Status: She is alert and oriented to person, place, and time.   Psychiatric:         Mood and Affect: Mood normal.         Behavior: Behavior normal.           RECENT LABS:  Hematology WBC   Date Value Ref Range Status   02/05/2024 4.01 3.40 - 10.80 10*3/mm3 Final     RBC   Date Value Ref Range Status   02/05/2024 4.18 3.77 - 5.28 10*6/mm3 Final     Hemoglobin   Date Value Ref Range Status   02/05/2024 13.5 12.0 - 15.9 g/dL Final     Hematocrit   Date Value Ref Range  Status   02/05/2024 40.2 34.0 - 46.6 % Final     Platelets   Date Value Ref Range Status   02/05/2024 193 140 - 450 10*3/mm3 Final          Assessment & Plan     47-year-old female with history of:    *DCIS left breast 2007 status post lumpectomy, radiation therapy and 3 years of tamoxifen.  Patient describes the tumor was ER positive but is unclear about additional testing.  She was seen in consultation 2/24/2022.   subsequent testing included a normal CBC, normal ferritin, iron profile with 10% saturation, CMP normal.  Diagnostic mammography are compared to 2016 along with ultrasound with no evidence in either modality of malignancy in either breast.  Genetic assessment just initiated 4/21/2022 with results- Variant of unknown significance detected-NF1  Plan screening mammography late March, early April 2023    *IBS  The patient underwent colonoscopy 3/21 with no significant abnormalities, biopsy of the descending colon revealed benign colonic mucosa, rectal biopsy was also negative.  Recent improvement on Pamelor Lotronex    *Erratic menses  Gynecologic consultation-subsequent endometrial ablation as well as permanent sterilization with laparoscopic salpingectomy, IUD removal.  She underwent these procedures 11/30/2022.  Subsequent findings include benign proliferative endometrium, fragments consist of endometrial polyp, stromal breakdown, no hyperplasia.      *Iron deficiency anemia  Placed on oral iron after visit 2/24/2022  Reassessment of iron status 8/5/2022 finds normal iron profile, ferritin and CBC, oral iron discontinued  Follow-up testing included CBC 1/3/2023 which has normalized with H&H 12.5 and 38.1, subsequent ferritin at 50.40, iron of 258, TIBC of 438 and 59% saturation  Follow-up testing included CBC 1/3/2023 which has normalized with H&H 12.5 and 38.1, subsequent ferritin at 50.40, iron of 258, TIBC of 438 and 59% saturation.  She is feeling reasonably well except for mild tenderness  abdominally postoperatively.  We have discussed that her saturation index appears to be moderately elevated?  And she is also concerned about her blood sugar which is also mildly elevated apparently postprandially.      Plan:  *Discontinue oral iron, reviewed her OTC vitamin which does not contain iron    *AM visit 1 to 2 months, CMP, ferritin, iron profile, hemoglobin A1c and TSH    *Subsequent mammography late March, early April 2023    *Gynecologic follow-up as needed.    *Neurologic follow-up as planned

## 2024-02-19 NOTE — PROGRESS NOTES
REASON FOR FOLLOWUP: History of DCIS left breast 2007                                  History of Present Illness   The patient is now a 48-year-old female seen in January 2022 by GI medicine presenting to [facility] care.  She has a known history of gestational diabetes, IBS diarrhea and been treated with Lotronex successfully.  She was seen by Dr. Blanco 1/18/2022 having moved to [place] in February of this year and with a history of IBS initially treated with Viberzi which was unsuccessful and the patient, thereafter, was treated successfully with Lotronex.  Plans were made for subsequent colonoscopy and, additionally, oncology recommendation was requested as result of a previous history of ductal carcinoma in situ.     Her records concerning this included review by Dr. Deepika Tejada of [facility] on 2/12/2020 with a history of DCIS (ER positive) status post lumpectomy and XRT in 2007 followed by tamoxifen x3 years discontinued 2010.  The patient describes genetic assessment for likely BRCA in 2007 which was negative.  The patient's been followed by yearly mammography and possibly every third year MRI.  She has been clinically stable without recurrence undergoing genetic assessment which was evidently unremarkable and also reviewed and treated for iron deficiency anemia.  Patient studies date from 2/12/2020 with ferritin 29.5, B12 906.5, vitamin D of 30.7, CMP with BUN/creatinine of 13.5 and 0.67, calcium 9.6, normal LFTs, iron of 115, TIBC of 3 1736% saturation, LDH of 142  The patient is initially seen 2/24/2022 at [facility] office and we discussed her history as well as periodic symptoms of left upper extremity tremor, recent ParaGard placement in 2018 for irregular menses, associated iron deficiency and need for additional genetic assessment with extended panel.    The patient subsequent testing included a normal CBC, normal ferritin, iron profile with 10% saturation,  CMP normal.  Diagnostic mammography are compared to 2016 along with ultrasound with no evidence in either modality of malignancy in either breast.    The patient underwent colonoscopy 3/21 with no significant abnormalities, biopsy of the descending colon revealed benign colonic mucosa, rectal biopsy was also negative.    The patient is seen 4/21/2022 with recent genetic assessment just drawn, plans for GYN assessment in the next several months and improvement in her IBS symptoms initially intolerant to Xifaxan but improved with Pamelor and Lotronex.  Her menses, ever, remain somewhat erratic even now.    The patient is next assessed 8/11/2022 with normalization of her CBC as well as iron stores.  Her genetics assessment has been completed with variant of unknown significance detected-NF1.  Patient states that her stamina has improved and though her menses are again erratic and often annoying that she feels relatively well.  She has gynecologic follow-up now scheduled.    This led to review by gynecology and the patient offered endometrial ablation as well as permanent sterilization with laparoscopic salpingectomy, IUD removal.  She underwent these procedures 11/30/2022.  Subsequent findings include benign proliferative endometrium, fragments consist of endometrial polyp, stromal breakdown, no hyperplasia.    Follow-up testing included CBC 1/3/2023 which has normalized with H&H 12.5 and 38.1, subsequent ferritin at 50.40, iron of 258, TIBC of 438 and 59% saturation.  She is feeling reasonably well except for mild tenderness abdominally postoperatively.    The patient is next assessed 2/19/2024 with follow-up CBC 2/5/2024 normal with H&H of 13.5 and 40.2, white count 4010, platelet count of 193,000, normal CMP, ferritin of 63.5, iron profile with 41% saturation, hemoglobin A1c of 5.1 and TSH of 1.46.  She has been assessed by neurology 1/17/2024 with benign essential tremor and also OB/GYN with a well examination.   The patient is reviewed formally 2/19/2024 and is felt to be doing extraordinarily well.        Past Medical History:   Diagnosis Date    Benign essential tremor     Breast cancer     Cancer     breast cancer 2007 DCIS    LEFT    Clotting disorder 2017    Irregular & very heavy flow during menstral cycles. For 36 hours will have to use super plus tampons & change every 1.5 - 2 hours    Gestational diabetes     Heavy periods     Hx of radiation therapy     IBS (irritable bowel syndrome)     with diarrhea    Iron deficiency     IUD (intrauterine device) in place     Low blood pressure     Menorrhagia with irregular cycle     Added automatically from request for surgery [id number]    Migraine 2007    On & off throughout the years. Typically once a quarter    Ovarian cyst 1997    Had cyst rupture while in undergrad, went on birth control pills at that time    Pulmonary nodules     Trigger finger of right thumb     s/p steroid injection with hand surgery    Urinary tract infection Feb 2021    First UTI last year    Varicella 1986    Had while in 5th grade        Past Surgical History:   Procedure Laterality Date    BREAST BIOPSY  March 2007    Had DCIS in left breast with surgery to remove the mass. Followed by radiation & 3.5 years of Tamoxifen. Was tested for BRCA 1 & 2 in 2005 with a recent updated tested with Dr. Calderón    BREAST LUMPECTOMY Left 2007    CARDIAC CATHETERIZATION  2001    Table top test. Had issues with fainting. Low blood pressure & was on medication until pregnancy. Haven’t had issues since.    COLONOSCOPY  approx 2011    negative per pt     COLONOSCOPY N/A 03/21/2022    Procedure: COLONOSCOPY to cecum and TI with biopsies;  Surgeon: Tuan Blanco MD;  Location: [facility] ENDOSCOPY;  Service: Gastroenterology;  Laterality: N/A;  pre-change in bowel habits  post-hemorrhoids    D & C HYSTEROSCOPY ENDOMETRIAL ABLATION N/A 11/30/2022    Procedure: DILATATION AND CURETTAGE HYSTEROSCOPY NOVASURE  ENDOMETRIAL ABLATION;  Surgeon: Real Posada MD;  Location: [facility] OR;  Service: Obstetrics/Gynecology;  Laterality: N/A;    DIAGNOSTIC LAPAROSCOPY Bilateral 11/30/2022    Procedure: SALPINGECTOMY LAPAROSCOPIC, INTRAUTERINE DEVICE REMOVAL;  Surgeon: Real Posada MD;  Location: [facility] OR;  Service: Obstetrics/Gynecology;  Laterality: Bilateral;    TUBAL ABDOMINAL LIGATION  11.30.22    TUMOR REMOVAL      right foot during childhood    WISDOM TOOTH EXTRACTION  1996    During undergrad        Current Outpatient Medications on File Prior to Visit   Medication Sig Dispense Refill    alosetron (LOTRONEX) 0.5 MG tablet Take 1 tablet by mouth 2 (Two) Times a Day. 180 tablet 3    multivitamin with minerals tablet tablet Take 1 tablet by mouth Daily.      nortriptyline (PAMELOR) 10 MG capsule Take 1 capsule by mouth Every Night. 90 capsule 3    SUMAtriptan (Imitrex) 25 MG tablet Take one tablet at onset of headache. May repeat dose one time in 2 hours if headache not relieved. 9 tablet 11     No current facility-administered medications on file prior to visit.        ALLERGIES:    Allergies   Allergen Reactions    Penicillins Hives        Social History     Socioeconomic History    Marital status:      Spouse name: Max    Number of children: 2    Years of education: College   Tobacco Use    Smoking status: Former     Types: Cigarettes    Smokeless tobacco: Never    Tobacco comments:     social in college    Vaping Use    Vaping Use: Never used   Substance and Sexual Activity    Alcohol use: Yes     Alcohol/week: 7.0 standard drinks of alcohol     Types: 7 Glasses of wine per week     Comment: On average, glass of wine a day    Drug use: Never    Sexual activity: Yes     Partners: Male     Birth control/protection: Tubal ligation        Family History   Adopted: Yes   Problem Relation Age of Onset    Malig Hyperthermia Neg Hx         Review of Systems   Constitutional: Negative.    HENT: Negative.     Eyes:  "Negative.    Respiratory: Negative.     Cardiovascular: Negative.    Gastrointestinal:  Diarrhea: See history of present illness.   Endocrine: Negative.    Genitourinary:  Positive for menstrual problem.   Musculoskeletal: Negative.    Skin: Negative.    Allergic/Immunologic: Negative.    Neurological:  Positive for tremors (Left upper extremity, episodic).   Hematological: Negative.    Psychiatric/Behavioral: Negative.         Objective     Vitals:    02/19/24 1633   BP: 118/78   Pulse: 74   Resp: 18   Temp: 98.4 °F (36.9 °C)   TempSrc: Temporal   SpO2: 100%   Weight: 52.7 kg (116 lb 1.6 oz)   Height: 161.3 cm (63.5\")   PainSc: 0-No pain           2/19/2024     4:33 PM   Current Status   ECOG score 0       Physical Exam  Constitutional:       Appearance: Normal appearance. She is normal weight.   HENT:      Head: Normocephalic and atraumatic.      Nose: Nose normal.      Mouth/Throat:      Mouth: Mucous membranes are moist.      Pharynx: Oropharynx is clear.   Eyes:      Extraocular Movements: Extraocular movements intact.      Conjunctiva/sclera: Conjunctivae normal.      Pupils: Pupils are equal, round, and reactive to light.   Cardiovascular:      Rate and Rhythm: Normal rate and regular rhythm.      Pulses: Normal pulses.      Heart sounds: Normal heart sounds.   Pulmonary:      Effort: Pulmonary effort is normal.      Breath sounds: Normal breath sounds.      Comments: Patient status post left breast lumpectomy, well-healed, radiation tattoos recognized, no additional abnormalities  Abdominal:      General: Bowel sounds are normal.      Palpations: Abdomen is soft.      Comments: Laparoscopic sites, healing   Musculoskeletal:         General: Normal range of motion.      Cervical back: Normal range of motion and neck supple.   Skin:     General: Skin is warm and dry.   Neurological:      General: No focal deficit present.      Mental Status: She is alert and oriented to person, place, and time.      Comments: " Essential tremor noted   Psychiatric:         Mood and Affect: Mood normal.         Behavior: Behavior normal.           RECENT LABS:  Hematology WBC   Date Value Ref Range Status   02/05/2024 4.01 3.40 - 10.80 10*3/mm3 Final     RBC   Date Value Ref Range Status   02/05/2024 4.18 3.77 - 5.28 10*6/mm3 Final     Hemoglobin   Date Value Ref Range Status   02/05/2024 13.5 12.0 - 15.9 g/dL Final     Hematocrit   Date Value Ref Range Status   02/05/2024 40.2 34.0 - 46.6 % Final     Platelets   Date Value Ref Range Status   02/05/2024 193 140 - 450 10*3/mm3 Final          Assessment & Plan     48-year-old female with history of:    *DCIS left breast 2007 status post lumpectomy, radiation therapy and 3 years of tamoxifen.  Patient describes the tumor was ER positive but is unclear about additional testing.  She was seen in consultation 2/24/2022.   subsequent testing included a normal CBC, normal ferritin, iron profile with 10% saturation, CMP normal.  Diagnostic mammography are compared to 2016 along with ultrasound with no evidence in either modality of malignancy in either breast.  Genetic assessment just initiated 4/21/2022 with results- Variant of unknown significance detected-NF1  Plan screening mammography late March, early April 2023  The patient is now scheduled for subsequent mammography through her OB/GYN.    *IBS  The patient underwent colonoscopy 3/21 with no significant abnormalities, biopsy of the descending colon revealed benign colonic mucosa, rectal biopsy was also negative.  Recent improvement on Pamelor Lotronex  Follow-up with GI planned    *Erratic menses  Gynecologic consultation-subsequent endometrial ablation as well as permanent sterilization with laparoscopic salpingectomy, IUD removal.  She underwent these procedures 11/30/2022.  Subsequent findings include benign proliferative endometrium, fragments consist of endometrial polyp, stromal breakdown, no hyperplasia.  Established follow-up now with  OB/GYN.      *Iron deficiency anemia  Placed on oral iron after visit 2/24/2022  Reassessment of iron status 8/5/2022 finds normal iron profile, ferritin and CBC, oral iron discontinued  Follow-up testing included CBC 1/3/2023 which has normalized with H&H 12.5 and 38.1, subsequent ferritin at 50.40, iron of 258, TIBC of 438 and 59% saturation  Reassessed 2/19/2024 without evidence of iron deficiency      Plan:  *At this point the patient can be discharged from CBC office.  She has follow-up with both primary care, OB/GYN, neurology and GI.  *We will be available at any point and appreciate the opportunity to work with all involved in her care.

## 2024-04-12 ENCOUNTER — HOSPITAL ENCOUNTER (OUTPATIENT)
Facility: HOSPITAL | Age: 49
Discharge: HOME | End: 2024-04-12
Admitting: OBSTETRICS & GYNECOLOGY
Payer: COMMERCIAL

## 2024-04-12 DIAGNOSIS — Z12.31 SCREENING MAMMOGRAM FOR BREAST CANCER: ICD-10-CM

## 2024-04-12 PROCEDURE — 77067 SCR MAMMO BI INCL CAD: CPT

## 2024-04-12 PROCEDURE — 77063 BREAST TOMOSYNTHESIS BI: CPT

## 2024-04-18 DIAGNOSIS — N64.89 BREAST ASYMMETRY: ICD-10-CM

## 2024-04-18 DIAGNOSIS — R92.8 ABNORMAL MAMMOGRAM: Primary | ICD-10-CM

## 2024-04-26 ENCOUNTER — APPOINTMENT (OUTPATIENT)
Dept: WOMENS IMAGING | Facility: HOSPITAL | Age: 49
End: 2024-04-26
Payer: COMMERCIAL

## 2024-04-26 PROCEDURE — 77065 DX MAMMO INCL CAD UNI: CPT | Performed by: RADIOLOGY

## 2024-04-26 PROCEDURE — G0279 TOMOSYNTHESIS, MAMMO: HCPCS | Performed by: RADIOLOGY

## 2024-04-26 PROCEDURE — 76642 ULTRASOUND BREAST LIMITED: CPT | Performed by: RADIOLOGY

## 2024-04-26 PROCEDURE — 77061 BREAST TOMOSYNTHESIS UNI: CPT | Performed by: RADIOLOGY

## 2024-04-30 DIAGNOSIS — R92.8 ABNORMAL MAMMOGRAM: Primary | ICD-10-CM

## 2024-04-30 DIAGNOSIS — N63.10 MASS OF RIGHT BREAST, UNSPECIFIED QUADRANT: ICD-10-CM

## 2024-05-20 ENCOUNTER — LAB REQUISITION (OUTPATIENT)
Dept: LAB | Facility: HOSPITAL | Age: 49
End: 2024-05-20
Payer: COMMERCIAL

## 2024-05-20 ENCOUNTER — APPOINTMENT (OUTPATIENT)
Dept: WOMENS IMAGING | Facility: HOSPITAL | Age: 49
End: 2024-05-20
Payer: COMMERCIAL

## 2024-05-20 DIAGNOSIS — N63.0 UNSPECIFIED LUMP IN UNSPECIFIED BREAST: ICD-10-CM

## 2024-05-20 PROBLEM — N30.01 ACUTE CYSTITIS WITH HEMATURIA: Status: ACTIVE | Noted: 2024-05-20

## 2024-05-20 PROCEDURE — 10005 FNA BX W/US GDN 1ST LES: CPT | Performed by: RADIOLOGY

## 2024-05-20 PROCEDURE — 88112 CYTOPATH CELL ENHANCE TECH: CPT | Performed by: OBSTETRICS & GYNECOLOGY

## 2024-05-21 ENCOUNTER — TELEPHONE (OUTPATIENT)
Dept: GASTROENTEROLOGY | Facility: CLINIC | Age: 49
End: 2024-05-21
Payer: COMMERCIAL

## 2024-05-21 NOTE — TELEPHONE ENCOUNTER
Caller: EVELYN MATA    Relationship: SELF    Best call back number: [phone]    Requested Prescriptions: nortriptyline (PAMELOR) 10 MG capsule,alosetron (LOTRONEX) 0.5 MG tablet 2 X'S DAILY   Requested Prescriptions      No prescriptions requested or ordered in this encounter        Pharmacy where request should be sent:  [facility]     Last office visit with prescribing clinician: 5/18/2023   Last telemedicine visit with prescribing clinician: Visit date not found   Next office visit with prescribing clinician: Visit date not found     Additional details provided by patient:     Does the patient have less than a 3 day supply:  [] Yes  [x] No    Would you like a call back once the refill request has been completed: [] Yes [x] No    If the office needs to give you a call back, can they leave a voicemail: [x] Yes [] No    Paty Mendoza Rep   05/21/24 14:09 EDT

## 2024-05-22 LAB
CYTO UR: NORMAL
DX PRELIMINARY: NORMAL
LAB AP CASE REPORT: NORMAL
LAB AP CLINICAL INFORMATION: NORMAL
PATH REPORT.FINAL DX SPEC: NORMAL
PATH REPORT.GROSS SPEC: NORMAL

## 2024-05-24 ENCOUNTER — PATIENT ROUNDING (BHMG ONLY) (OUTPATIENT)
Age: 49
End: 2024-05-24
Payer: COMMERCIAL

## 2024-05-24 NOTE — ED NOTES
Thank you for letting us care for you in your recent visit to our [facility] urgent care center. We would love to hear about your experience with us. Was this the first time you have visited our location?    We’re always looking for ways to make our patients’ experiences even better. Do you have any recommendations on ways we may improve?     I appreciate you taking the time to respond. Please be on the lookout for a survey about your recent visit from [facility] via text or email. We would greatly appreciate if you could fill that out and turn it back in. We want your voice to be heard and we value your feedback.   Thank you for choosing [facility] for your healthcare needs.     If you have concerns or would like to speak to me in person regarding your visit please feel free to give me a call. [phone]    Hope you get well soon and thank you.    Filippo Drummond LPN Practice Manager

## 2024-05-26 ENCOUNTER — DOCUMENTATION (OUTPATIENT)
Dept: INTERNAL MEDICINE | Facility: CLINIC | Age: 49
End: 2024-05-26
Payer: COMMERCIAL

## 2024-05-26 NOTE — PROGRESS NOTES
I received an urgent [facility] call from patient.  She had a fever starting Friday to 102. Took Tylenol, ibuprofen. Temp came down to 99.8. She no longer has a fever. Today she broke out in hives. She took Benadryl without much relief. She had a UTI and was treated with Bactrim which she finished Bactrim.  I advised patient to go to urgent care since she feels like the rash is getting worse and she is not treating it adequately at home

## 2024-05-30 RX ORDER — ALOSETRON HYDROCHLORIDE 0.5 MG/1
0.5 TABLET ORAL 2 TIMES DAILY
Qty: 180 TABLET | Refills: 3 | Status: SHIPPED | OUTPATIENT
Start: 2024-05-30

## 2024-05-30 RX ORDER — NORTRIPTYLINE HYDROCHLORIDE 10 MG/1
10 CAPSULE ORAL NIGHTLY
Qty: 90 CAPSULE | Refills: 3 | Status: SHIPPED | OUTPATIENT
Start: 2024-05-30

## 2024-05-30 NOTE — TELEPHONE ENCOUNTER
Caller: Patti Hogue    Relationship: Self    Best call back number: [phone]     Requested Prescriptions:   Requested Prescriptions     Pending Prescriptions Disp Refills    nortriptyline (PAMELOR) 10 MG capsule [Pharmacy Med Name: NORTRIPTYLINE HCL 10 MG CAP] 90 capsule 3     Sig: TAKE 1 CAPSULE BY MOUTH EVERY DAY AT NIGHT    alosetron (LOTRONEX) 0.5 MG tablet 180 tablet 3     Sig: Take 1 tablet by mouth 2 (Two) Times a Day.        Pharmacy where request should be sent: [facility]Y #[id number] - [place], [place] - [address] AT NEAR [facility] & [facility] - [phone] [facility] [phone] FX     Last office visit with prescribing clinician: 5/18/2023   Last telemedicine visit with prescribing clinician: Visit date not found   Next office visit with prescribing clinician: Visit date not found     Additional details provided by patient: PT HAS APPT WITH [facility] ON 07/31/24 FOR YEARLY F/U BUT WILL RUN OUT OF MEDICATION THIS WEEKEND. CAN WE SEND A NEW SCRIPT TO GET HER TO APPT PLEASE.    Does the patient have less than a 3 day supply:  [x] Yes  [] No    Would you like a call back once the refill request has been completed: [] Yes [] No    If the office needs to give you a call back, can they leave a voicemail: [] Yes [] No    Paty Moscoso Rep   05/30/24 11:21 EDT

## 2024-06-05 NOTE — TELEPHONE ENCOUNTER
Tuan Blanco MD Stowers, Sharon G, RN; [facility]  Caller: Unspecified (2 weeks ago)  Okay to refill till patient seen but it has been over a year so needs follow-up

## 2024-07-16 ENCOUNTER — OFFICE VISIT (OUTPATIENT)
Dept: NEUROLOGY | Facility: CLINIC | Age: 49
End: 2024-07-16
Payer: COMMERCIAL

## 2024-07-16 VITALS
SYSTOLIC BLOOD PRESSURE: 112 MMHG | OXYGEN SATURATION: 100 % | DIASTOLIC BLOOD PRESSURE: 72 MMHG | WEIGHT: 116 LBS | BODY MASS INDEX: 20.55 KG/M2

## 2024-07-16 DIAGNOSIS — G25.0 BENIGN ESSENTIAL TREMOR: Primary | ICD-10-CM

## 2024-07-16 NOTE — PROGRESS NOTES
DOS: 2024  NAME: Patti Hogue   : 1975  PCP: Chente Mathews DO    Chief Complaint   Patient presents with    Benign essential tremor      SUBJECTIVE  Neurological Problem:  48 y.o. RHAA female with a past medical history of IBS, h/o breast ca in situ, migraine, benign essential tremor. They are seen in follow up today for essential tremor, however the problem is new to the examiner. Patient last seen by Dr. Maximilian Reyna in 2024, with a summary of the history taken from the previous note with additions/modifications as indicated. She is unaccompanied.    Interval History:   Mrs. Hogue initially presented to our clinic in 2024 for the evaluation of a tremor, previously seen by Dr. Reyna. Per review of chart she reported a tremor of her hands more prominent on the left side especially when holding her hands in certain positions. It seemed to have progressed to occur more frequently throughout the day. She is adopted and unaware of her genetic family history. She denied a resting tremor, no change in facial expression, no gait change. Dr. Reyna felt she had benign essential tremor. She decided to forego medical management at her last visit in January.    She presents today in follow up and says the tremor is no worse but she feels more aware of it and what triggers it. She says she notices it occurring more when she is stressed. She sees it most at dinner when holding her utensils or her phone - that time of day is when she is more present and therefore more cognizant of her movements. The tremor still presents predominantly in her left hand but she notices it some in her right. It is not in her legs or head/neck. She says overall it is not that noticeable and more of an annoyance than anything. She denies any weakness to her hands or  change, no paresthesias or sensation changes. She denies any focal or unilateral symptoms such as visual or speech deficit, unilateral weakness or numbness,  no headache or dizziness.     Review of Systems   Neurological:  Positive for tremors.        The following portions of the patient's history were reviewed and updated as appropriate: allergies, current medications, past family history, past medical history, past social history, past surgical history and problem list.    Current Medications:   Current Outpatient Medications:     alosetron (LOTRONEX) 0.5 MG tablet, Take 1 tablet by mouth 2 (Two) Times a Day., Disp: 180 tablet, Rfl: 3    multivitamin with minerals tablet tablet, Take 1 tablet by mouth Daily., Disp: , Rfl:     nortriptyline (PAMELOR) 10 MG capsule, TAKE 1 CAPSULE BY MOUTH EVERY DAY AT NIGHT, Disp: 90 capsule, Rfl: 3    SUMAtriptan (Imitrex) 25 MG tablet, Take one tablet at onset of headache. May repeat dose one time in 2 hours if headache not relieved., Disp: 9 tablet, Rfl: 11  **I did not stop or change the above medications.  Patient's medication list was updated to reflect medications they have reported as currently taking, including medication changes made by other providers.    Objective   Vital Signs:  /72   Wt 52.6 kg (116 lb)   SpO2 100%   BMI 20.55 kg/m²   Body mass index is 20.55 kg/m².    Physical Exam   Physical Exam:  GENERAL: NAD, alert  HEENT: Normocephalic, atraumatic   Resp: Even and unlabored  Extremities: No edema  Skin: Warm and dry  Psychiatric: Normal mood and affect    Neurological:   MS: AOx3, recent/remote memory intact, normal attention/concentration, language intact, no neglect.  CN: visual acuity grossly normal, PERRL, EOMI, no facial droop, no dysarthria  Motor: Normal tone and bulk. No abnormal movements noted. No asterixis. No overshoot. Mild intention tremor of left upper extremity.   Trapezius elevation: 5/5  Shoulder abductors 5/5  Elbow flexors 5/5  Elbow extensors 5/5   Wrist extensors 4/5  Left  2+  Right  2+  Abductus pollicis brevis 4/5  Sensory: Intact to crude touch in all four ext.  Rapid  "alternating movements: Normal finger to thumb tap  Gait and station: Normal gait and station      Result Review :  The following data was reviewed by: ANGÉLICA Beal on 07/16/2024:  Laboratory Results:         Lab Results   Component Value Date    WBC 4.01 02/05/2024    HGB 13.5 02/05/2024    HCT 40.2 02/05/2024    MCV 96.2 02/05/2024     02/05/2024     Lab Results   Component Value Date    GLUCOSE 107 (H) 02/05/2024    BUN 13 02/05/2024    CREATININE 0.79 02/05/2024    EGFRIFNONA 85 02/24/2022    EGFRIFAFRI 103 02/24/2022    BCR 16.5 02/05/2024    K 4.4 02/05/2024    CO2 27.3 02/05/2024    CALCIUM 9.2 02/05/2024    PROTENTOTREF 6.8 08/07/2023    ALBUMIN 4.4 02/05/2024    LABIL2 2.4 08/07/2023    AST 16 02/05/2024    ALT 9 02/05/2024     Lab Results   Component Value Date    HGBA1C 5.10 02/05/2024     No results found for: \"CHOL\"  Lab Results   Component Value Date     (H) 08/07/2023     07/07/2022     Lab Results   Component Value Date    LDL 80 08/07/2023    LDL 75 07/07/2022     Lab Results   Component Value Date    TRIG 63 08/07/2023    TRIG 64 07/07/2022     No results found for: \"RPR\"  Lab Results   Component Value Date    TSH 1.460 02/05/2024     No results found for: \"[id number]\"             Assessment and Plan   Diagnoses and all orders for this visit:    1. Benign essential tremor (Primary)      At this time Patti does not want to pursue medical management of her tremor. We did discuss the use of non-pharmacological treatments such as weighted hand glove, weighted utensils and writing devices. Patti will continue to monitor her tremor until next follow-up and let us know if there are any changes beforehand.     We will see Patti back in 12 months, sooner if symptoms warrant.     ANGÉLICA Beal  [facility]   07/16/24       Follow Up   Return in about 1 year (around 7/16/2025).  Patient was given instructions and counseling regarding her condition or for health " maintenance advice. Please see specific information pulled into the AVS if appropriate.       Dictated using Dragon Dictation

## 2024-07-31 ENCOUNTER — OFFICE VISIT (OUTPATIENT)
Dept: GASTROENTEROLOGY | Facility: CLINIC | Age: 49
End: 2024-07-31
Payer: COMMERCIAL

## 2024-07-31 VITALS — WEIGHT: 116 LBS | TEMPERATURE: 97.9 F | BODY MASS INDEX: 19.81 KG/M2 | HEIGHT: 64 IN

## 2024-07-31 DIAGNOSIS — K58.0 IRRITABLE BOWEL SYNDROME WITH DIARRHEA: Primary | ICD-10-CM

## 2024-07-31 PROCEDURE — 99213 OFFICE O/P EST LOW 20 MIN: CPT | Performed by: NURSE PRACTITIONER

## 2024-07-31 NOTE — PROGRESS NOTES
Chief Complaint   Patient presents with    Irritable Bowel Syndrome       HPI    Patti Hogue is a  48 y.o. female here for a follow up visit for irritable bowel syndrome.    This patient follows with Dr. Blanco, new to me.    On visit today she reports feeling quite well.  She takes Pamelor 10 mg at bedtime for irritable bowel syndrome along with twice daily dosing Lotronex.  Bowels move every day to every other day with formed stool incomplete evacuation.  No rectal bleeding or rectal pain.  No abdominal pain.  Her appetite is good.  Her weight is stable.    Additional data reviewed:    C-scope 2022 - Normal ileum. NBIH.  Recall 10 years.     Past Medical History:   Diagnosis Date    Benign essential tremor     Breast cancer     Cancer     breast cancer 2007 DCIS    LEFT    Clotting disorder 2017    Irregular & very heavy flow during menstral cycles. For 36 hours will have to use super plus tampons & change every 1.5 - 2 hours    Gestational diabetes     Heavy periods     Hx of radiation therapy     IBS (irritable bowel syndrome)     with diarrhea    Iron deficiency     IUD (intrauterine device) in place     Low blood pressure     Menorrhagia with irregular cycle     Added automatically from request for surgery [id number]    Migraine 2007    On & off throughout the years. Typically once a quarter    Ovarian cyst 1997    Had cyst rupture while in undergrad, went on birth control pills at that time    Pulmonary nodules     Trigger finger of right thumb     s/p steroid injection with hand surgery    Urinary tract infection Feb 2021    First UTI last year    Varicella 1986    Had while in 5th grade       Past Surgical History:   Procedure Laterality Date    BREAST BIOPSY  March 2007    Had DCIS in left breast with surgery to remove the mass. Followed by radiation & 3.5 years of Tamoxifen. Was tested for BRCA 1 & 2 in 2005 with a recent updated tested with Dr. Calderón    BREAST LUMPECTOMY Left 2007    CARDIAC  CATHETERIZATION  2001    Table top test. Had issues with fainting. Low blood pressure & was on medication until pregnancy. Haven’t had issues since.    COLONOSCOPY  approx 2011    negative per pt     COLONOSCOPY N/A 03/21/2022    Procedure: COLONOSCOPY to cecum and TI with biopsies;  Surgeon: Tuan Blanco MD;  Location: [facility] ENDOSCOPY;  Service: Gastroenterology;  Laterality: N/A;  pre-change in bowel habits  post-hemorrhoids    D & C HYSTEROSCOPY ENDOMETRIAL ABLATION N/A 11/30/2022    Procedure: DILATATION AND CURETTAGE HYSTEROSCOPY NOVASURE ENDOMETRIAL ABLATION;  Surgeon: Real Posada MD;  Location: [facility] MAIN OR;  Service: Obstetrics/Gynecology;  Laterality: N/A;    DIAGNOSTIC LAPAROSCOPY Bilateral 11/30/2022    Procedure: SALPINGECTOMY LAPAROSCOPIC, INTRAUTERINE DEVICE REMOVAL;  Surgeon: Real Posada MD;  Location: [facility] OR;  Service: Obstetrics/Gynecology;  Laterality: Bilateral;    TUBAL ABDOMINAL LIGATION  11.30.22    TUMOR REMOVAL      right foot during childhood    US GUIDED CYST ASPIRATION BREAST N/A 5/20/2024    WISDOM TOOTH EXTRACTION  1996    During undergrad       Scheduled Meds:     Continuous Infusions: No current facility-administered medications for this visit.      PRN Meds:     Allergies   Allergen Reactions    Penicillins Hives       Social History     Socioeconomic History    Marital status:      Spouse name: Max    Number of children: 2    Years of education: College   Tobacco Use    Smoking status: Former     Types: Cigarettes    Smokeless tobacco: Never    Tobacco comments:     social in college    Vaping Use    Vaping status: Never Used   Substance and Sexual Activity    Alcohol use: Yes     Alcohol/week: 7.0 standard drinks of alcohol     Types: 7 Glasses of wine per week     Comment: On average, glass of wine a day    Drug use: Never    Sexual activity: Yes     Partners: Male     Birth control/protection: Tubal ligation       Family History   Adopted: Yes   Problem  Relation Age of Onset    Malig Hyperthermia Neg Hx        Review of Systems   HENT:  Negative for trouble swallowing.    Gastrointestinal: Negative.        Vitals:    07/31/24 0836   Temp: 97.9 °F (36.6 °C)       Physical Exam  Constitutional:       Appearance: She is well-developed.   Abdominal:      General: Bowel sounds are normal. There is no distension.      Palpations: Abdomen is soft. There is no mass.      Tenderness: There is no abdominal tenderness. There is no guarding.      Hernia: No hernia is present.   Skin:     General: Skin is warm and dry.      Capillary Refill: Capillary refill takes less than 2 seconds.   Neurological:      Mental Status: She is alert and oriented to person, place, and time.   Psychiatric:         Behavior: Behavior normal.     Assessment    Diagnoses and all orders for this visit:    1. Irritable bowel syndrome with diarrhea (Primary)       Plan    Stable IBS-D continue combination of Pamelor and Lotronex  No alarm symptoms on visit today to warrant endoscopic evaluation  Follow-up 1 year or sooner if needed         ANGÉLICA Johnson  [facility]  [address] [place] [address]  Office: [phone]

## 2024-08-21 ENCOUNTER — OFFICE VISIT (OUTPATIENT)
Dept: INTERNAL MEDICINE | Facility: CLINIC | Age: 49
End: 2024-08-21
Payer: COMMERCIAL

## 2024-08-21 VITALS
DIASTOLIC BLOOD PRESSURE: 60 MMHG | HEIGHT: 64 IN | BODY MASS INDEX: 20.14 KG/M2 | TEMPERATURE: 98 F | WEIGHT: 118 LBS | SYSTOLIC BLOOD PRESSURE: 100 MMHG | OXYGEN SATURATION: 100 % | HEART RATE: 75 BPM

## 2024-08-21 DIAGNOSIS — Z00.00 ANNUAL PHYSICAL EXAM: Primary | ICD-10-CM

## 2024-08-21 DIAGNOSIS — H61.21 IMPACTED CERUMEN OF RIGHT EAR: ICD-10-CM

## 2024-08-21 PROCEDURE — 99396 PREV VISIT EST AGE 40-64: CPT | Performed by: STUDENT IN AN ORGANIZED HEALTH CARE EDUCATION/TRAINING PROGRAM

## 2024-08-21 NOTE — PROGRESS NOTES
"  Chente Mathews D.O.  Internal Medicine  [facility]  [address], [address]  [place] [address]  [phone]    Chief Complaint  Annual checkup    HISTORY    Patti Hogue is a 48 y.o. female who presents to the office today as a  an established patient for their annual preventative exam.     No hospitalization(s) within the last year.     Current exercise regimen: not currently exercising regularly but reports an active lifestyle \"emmanuel\"    Status of chronic medical conditions:    IBS-D: follows with [facility], had normal colonoscopy 3/2022. Currently being treated with Lotronex 0.5 mg twice daily (which has been since 2011) and nortriptyline 10 mg in the evening with great improvement in symptoms.      DCIS left breast 2007 status post lumpectomy, radiation therapy and 3 years of tamoxifen: has seen [facility] and [facility] counselor as well. Now released from heme/onc follow up.      Iron deficiency: has followed with [facility] for this as well and previously on iron replacement. No longer requiring replacement.    Benign essential tremor: follows with [facility], not currently on treatment. Not getting worse.     Migraines: takes imitrex as needed, occurs once monthly    Few sub-6 mm bilateral solid pulmonary nodules : following with CT scan later this year    Patient's overall comments about their health or any other specific health concerns they report: none    GYN History:      *Patient's GYN Practice: [facility]     *irregular periods     *tubal ligation     *Previous pregnancies: 3.  Live births: 2.  Miscarriages: 1 . Elective abortions: 0.      Health Maintenance Summary            INFLUENZA VACCINE (Yearly - August to March) Due since 8/1/2024 09/29/2023  Outside Immunization: Flu MDCK Quad P-Free Inj    11/19/2022  Imm Admin: Influenza Injectable Mdck Pf Quad    11/11/2021  Imm Admin: Influenza, Unspecified    11/11/2021  Imm Admin: Influenza " Injectable Mdck Pf Quad              MAMMOGRAM (Yearly) Next due on 4/26/2025 04/26/2024  Mammo Diagnostic Digital Tomosynthesis Right With CAD    04/12/2024  Mammo Screening Digital Tomosynthesis Bilateral With CAD    04/10/2023  Mammo Screening Digital Tomosynthesis Bilateral With CAD    03/28/2022  Mammo Diagnostic Digital Tomosynthesis Bilateral With CAD    02/11/2020  SCANNED - MAMMO    Only the first 5 history entries have been loaded, but more history exists.              ANNUAL PHYSICAL (Yearly) Next due on 8/21/2025 08/21/2024  Done    08/07/2023  Done    07/07/2022  Done              PAP SMEAR (Every 3 Years) Next due on 10/18/2025      10/18/2022  IGP, Aptima HPV, Rfx 16 / 18,45              TDAP/TD VACCINES (2 - Tdap) Next due on 1/1/2026 01/01/2016  Imm Admin: Td              COLORECTAL CANCER SCREENING (COLONOSCOPY - Every 10 Years) Next due on 3/21/2032      03/21/2022  COLONOSCOPY    03/21/2022  Surgical Procedure: COLONOSCOPY              HEPATITIS C SCREENING  Completed      06/08/2022  Hep C Virus Ab component of Hepatitis C antibody              COVID-19 Vaccine (Series Information) Completed      09/29/2023  Imm Admin: COVID-19 F23 (MODERNA) 12YRS+ (SPIKEVAX)    11/27/2021  Imm Admin: COVID-19 (MODERNA) 1st, 2nd, 3rd Dose Only    04/14/2021  Imm Admin: COVID-19 (MODERNA) 1st, 2nd, 3rd Dose Only    03/17/2021  Imm Admin: COVID-19 (MODERNA) 1st, 2nd, 3rd Dose Only              Pneumococcal Vaccine 0-64 (Series Information) Aged Out      No completion history exists for this topic.                     Allergies   Allergen Reactions    Penicillins Hives        Outpatient Medications Marked as Taking for the 8/21/24 encounter (Office Visit) with Chente Mathews,    Medication Sig Dispense Refill    alosetron (LOTRONEX) 0.5 MG tablet Take 1 tablet by mouth 2 (Two) Times a Day. 180 tablet 3    multivitamin with minerals tablet tablet Take 1 tablet by mouth Daily.      nortriptyline  (PAMELOR) 10 MG capsule TAKE 1 CAPSULE BY MOUTH EVERY DAY AT NIGHT 90 capsule 3    SUMAtriptan (Imitrex) 25 MG tablet Take one tablet at onset of headache. May repeat dose one time in 2 hours if headache not relieved. 9 tablet 11        Past Medical History:   Diagnosis Date    Benign essential tremor     Breast cancer     Cancer     breast cancer 2007 DCIS    LEFT    Clotting disorder 2017    Irregular & very heavy flow during menstral cycles. For 36 hours will have to use super plus tampons & change every 1.5 - 2 hours    Gestational diabetes     Heavy periods     Hx of radiation therapy     IBS (irritable bowel syndrome)     with diarrhea    Iron deficiency     IUD (intrauterine device) in place     Low blood pressure     Menorrhagia with irregular cycle     Added automatically from request for surgery [id number]    Migraine 2007    On & off throughout the years. Typically once a quarter    Ovarian cyst 1997    Had cyst rupture while in undergrad, went on birth control pills at that time    Pulmonary nodules     Trigger finger of right thumb     s/p steroid injection with hand surgery    Urinary tract infection Feb 2021    First UTI last year    Varicella 1986    Had while in 5th grade     Past Surgical History:   Procedure Laterality Date    BREAST BIOPSY  March 2007    Had DCIS in left breast with surgery to remove the mass. Followed by radiation & 3.5 years of Tamoxifen. Was tested for BRCA 1 & 2 in 2005 with a recent updated tested with Dr. Calderón    BREAST LUMPECTOMY Left 2007    CARDIAC CATHETERIZATION  2001    Table top test. Had issues with fainting. Low blood pressure & was on medication until pregnancy. Haven’t had issues since.    COLONOSCOPY  approx 2011    negative per pt     COLONOSCOPY N/A 03/21/2022    Procedure: COLONOSCOPY to cecum and TI with biopsies;  Surgeon: Tuan Blanco MD;  Location: [facility] ENDOSCOPY;  Service: Gastroenterology;  Laterality: N/A;  pre-change in bowel  "habits  post-hemorrhoids    D & C HYSTEROSCOPY ENDOMETRIAL ABLATION N/A 11/30/2022    Procedure: DILATATION AND CURETTAGE HYSTEROSCOPY NOVASURE ENDOMETRIAL ABLATION;  Surgeon: Real Posada MD;  Location: [facility];  Service: Obstetrics/Gynecology;  Laterality: N/A;    DIAGNOSTIC LAPAROSCOPY Bilateral 11/30/2022    Procedure: SALPINGECTOMY LAPAROSCOPIC, INTRAUTERINE DEVICE REMOVAL;  Surgeon: Real Posada MD;  Location: [facility] OR;  Service: Obstetrics/Gynecology;  Laterality: Bilateral;    TUBAL ABDOMINAL LIGATION  11.30.22    TUMOR REMOVAL      right foot during childhood    US GUIDED CYST ASPIRATION BREAST N/A 5/20/2024    WISDOM TOOTH EXTRACTION  1996    During undergrad     Family History   Adopted: Yes   Problem Relation Age of Onset    Malig Hyperthermia Neg Hx     reports that she has quit smoking. Her smoking use included cigarettes. She has never used smokeless tobacco. She reports current alcohol use of about 7.0 standard drinks of alcohol per week. She reports that she does not use drugs.    Immunization History   Administered Date(s) Administered    COVID-19 (MODERNA) 1st,2nd,3rd Dose Monovalent 03/17/2021, 04/14/2021, 11/27/2021    COVID-19 F23 (MODERNA) 12YRS+ (SPIKEVAX) 09/29/2023    Influenza Injectable Mdck Pf Quad 11/11/2021, 11/19/2022    Influenza, Unspecified 11/11/2021    Td (TDVAX) 01/01/2016        OBJECTIVE    Vital Signs:   /60   Pulse 75   Temp 98 °F (36.7 °C) (Oral)   Ht 161.3 cm (63.5\")   Wt 53.5 kg (118 lb)   SpO2 100%   BMI 20.57 kg/m²     Physical Exam  Vitals reviewed.   Constitutional:       General: She is not in acute distress.     Appearance: Normal appearance.   HENT:      Head: Normocephalic and atraumatic.      Right Ear: External ear normal. There is impacted cerumen.      Left Ear: Tympanic membrane, ear canal and external ear normal. There is no impacted cerumen.      Mouth/Throat:      Mouth: Mucous membranes are moist.      Pharynx: No oropharyngeal " exudate or posterior oropharyngeal erythema.   Eyes:      General: No scleral icterus.     Extraocular Movements: Extraocular movements intact.      Conjunctiva/sclera: Conjunctivae normal.      Pupils: Pupils are equal, round, and reactive to light.   Cardiovascular:      Rate and Rhythm: Normal rate and regular rhythm.      Heart sounds: Normal heart sounds. No murmur heard.  Pulmonary:      Effort: Pulmonary effort is normal. No respiratory distress.      Breath sounds: Normal breath sounds. No wheezing.   Abdominal:      General: Bowel sounds are normal. There is no distension.      Palpations: Abdomen is soft.      Tenderness: There is no abdominal tenderness. There is no guarding.   Musculoskeletal:      Cervical back: Neck supple.      Right lower leg: No edema.      Left lower leg: No edema.   Lymphadenopathy:      Cervical: No cervical adenopathy.   Skin:     General: Skin is warm and dry.      Coloration: Skin is not jaundiced.   Neurological:      General: No focal deficit present.      Mental Status: She is alert and oriented to person, place, and time.      Cranial Nerves: No cranial nerve deficit.      Motor: No weakness.   Psychiatric:         Mood and Affect: Mood normal.         Behavior: Behavior normal.         Thought Content: Thought content normal.                         The ASCVD Risk score (Kady DK, et al., 2019) failed to calculate for the following reasons:    The valid HDL cholesterol range is 20 to 100 mg/dL     ASSESSMENT & PLAN     #Annual Preventative Health Examination   -Age and sex appropriate physical exam performed and documented. Updated past medical, family, social and surgical histories as well as allergies and care team list. Addressed care gaps listed in the medical record.  -Encouraged annual dental and vision exams as part of their overall health.  -Encouraged minimum of 30 minutes or more of exercise at a brisk walk or higher 5 days per week combined with a  well-balanced diet.  -Immunizations reviewed and updated in EMR. Influenza recommended.  -Lipid screening:  Patient is over age 40 and a 10-year ASCVD risk was calculated which does not indicate a need for statin therapy.   -Aspirin for primary or secondary prevention: Not applicable, patient is less than age 50.  -Depression and Anxiety screening: Patient denies symptom of anxiety or depression.  -Diabetes screening:  Screening not indicated at this time.   -Tobacco use screening: Conducted and addressed if indicated.   -Alcohol use screening: Conducted and addressed if indicated.   -Illicit drug screening: Conducted and addressed if indicated.   -Hypertension screening: Patient screened negative for HTN today.  -HIV screening: pt declined  -Syphilis screening: Syphilis screening not indicated.  -Hepatitis B virus screening: Screening not indicated, not in a high-risk group.  -Hepatitis C virus screening:  pt declines screening   -Colon cancer screening: Patient is already up to date on their colon cancer screening with colonoscopy is indicated again in 2032.  -Lung cancer screening: Patient is less than age 50, screening not indicated.  -Cervical cancer screening:  Informed patient that the USPSTF recommends screening for cervical cancer every 3 years with cervical cytology alone in women aged 21 to 29 years. For women aged 30 to 65 years, the USPSTF recommends screening every 3 years with cervical cytology alone, every 5 years with high-risk human papillomavirus (hrHPV) testing alone, or every 5 years with HPV testing in combination with cytology (cotesting). Lasp pap : was normal 10/2022 with cotesting  -Breast cancer screening:  up to date as of 4/2024  -Osteoporosis screening: Informed patient that the USPSTF recommends screening for osteoporosis with bone measurement testing to prevent osteoporotic fractures in women 65 years and older. Screening is not applicable at this time.       Begin ear wax softening  drops for cerumen impaction on the right.    Follow up in 1 year for annual physical exam.    Patient/family had no further questions at this time and verbalized understanding of the plan discussed today.

## 2024-09-17 ENCOUNTER — HOSPITAL ENCOUNTER (OUTPATIENT)
Facility: HOSPITAL | Age: 49
Discharge: HOME | End: 2024-09-17
Admitting: STUDENT IN AN ORGANIZED HEALTH CARE EDUCATION/TRAINING PROGRAM
Payer: COMMERCIAL

## 2024-09-17 DIAGNOSIS — R91.8 PULMONARY NODULES: ICD-10-CM

## 2024-09-17 PROCEDURE — 71250 CT THORAX DX C-: CPT

## 2024-09-19 DIAGNOSIS — R91.8 PULMONARY NODULES: Primary | ICD-10-CM

## 2025-02-10 ENCOUNTER — OFFICE VISIT (OUTPATIENT)
Dept: OBSTETRICS AND GYNECOLOGY | Age: 50
End: 2025-02-10
Payer: COMMERCIAL

## 2025-02-10 VITALS
BODY MASS INDEX: 19.97 KG/M2 | HEIGHT: 64 IN | DIASTOLIC BLOOD PRESSURE: 82 MMHG | SYSTOLIC BLOOD PRESSURE: 128 MMHG | WEIGHT: 117 LBS

## 2025-02-10 DIAGNOSIS — Z12.4 SCREENING FOR MALIGNANT NEOPLASM OF CERVIX: ICD-10-CM

## 2025-02-10 DIAGNOSIS — Z12.31 SCREENING MAMMOGRAM FOR BREAST CANCER: ICD-10-CM

## 2025-02-10 DIAGNOSIS — Z11.51 SCREENING FOR HUMAN PAPILLOMAVIRUS (HPV): ICD-10-CM

## 2025-02-10 DIAGNOSIS — Z01.419 WELL FEMALE EXAM WITH ROUTINE GYNECOLOGICAL EXAM: Primary | ICD-10-CM

## 2025-02-10 DIAGNOSIS — L65.9 HAIR LOSS: ICD-10-CM

## 2025-02-10 RX ORDER — SUMATRIPTAN SUCCINATE 25 MG/1
TABLET ORAL
Qty: 9 TABLET | Refills: 11 | Status: SHIPPED | OUTPATIENT
Start: 2025-02-10

## 2025-02-10 NOTE — PROGRESS NOTES
"Subjective     Chief Complaint   Patient presents with    Gynecologic Exam     AC, pap normal . MG due 5-25.        History of Present Illness    Patti Hogue is a 49 y.o.  who presents for annual exam.  Patient has noticed some hair loss.  She has not had any hot flashes.  She has had a prior ablation.  Cycles are very short which she is happy with.  She has 2 sons ages 8 and 12.  They go to [employer].  Patient's had left-sided DCIS and has had a lumpectomy.  She will need mammogram in April  Her menses are irregular, q 1-2 months  lasting  1-2 days  , dysmenorrhea none   Obstetric History:  OB History          3    Para   2    Term   2            AB   1    Living   2         SAB   1    IAB        Ectopic        Molar        Multiple        Live Births   2               Menstrual History:     No LMP recorded. Patient has had an ablation.         Current contraception:  BS   History of abnormal Pap smear: no  Received Gardasil immunization: no  Perform regular self breast exam : yes  Family history of uterine or ovarian cancer: no adopted  pt's genetic testing neg   Family History of colon cancer: no  Family history of breast cancer: no    Mammogram: ordered.  Colonoscopy: up to date.   q1   DEXA: not indicated.    Exercise: no   Calcium/Vitamin D: adequate intake    The following portions of the patient's history were reviewed and updated as appropriate: allergies, current medications, past family history, past medical history, past social history, past surgical history, and problem list.        Objective   Physical Exam    /82   Ht 161.3 cm (63.5\")   Wt 53.1 kg (117 lb)   BMI 20.40 kg/m²     General:   alert, appears stated age and cooperative   Neck: thyroid normal to palpation   Heart: regular rate and rhythm   Lungs: clear to auscultation bilaterally   Abdomen: soft, non-tender, without masses or organomegaly   Breast: inspection negative, no nipple discharge or " bleeding, no masses or nodularity palpable   Vulva: normal, Bartholin's, Urethra, [place]'s normal   Vagina: normal mucosa, normal discharge   Cervix: no cervical motion tenderness and no lesions   Uterus: non-tender, normal shape and consistency   Adnexa: no mass, fullness, tenderness   Rectal: not indicated     Assessment & Plan   Diagnoses and all orders for this visit:    1. Well female exam with routine gynecological exam (Primary)  -     IGP, Apt HPV,rfx 16 / 18,45    2. Screening for human papillomavirus (HPV)  -     IGP, Apt HPV,rfx 16 / 18,45    3. Screening for malignant neoplasm of cervix  -     IGP, Apt HPV,rfx 16 / 18,45    4. Screening mammogram for breast cancer  -     Mammo Screening Digital Tomosynthesis Bilateral With CAD; Future    5. Hair loss  -     TSH    Other orders  -     SUMAtriptan (Imitrex) 25 MG tablet; Take one tablet at onset of headache. May repeat dose one time in 2 hours if headache not relieved.  Dispense: 9 tablet; Refill: 11    Hair loss-check thyroid.  Patient will take vitamins and she could see dermatology  Imitrex for migraines  We discussed breast tenderness-recommend vitamin E and decrease of caffeine.    All questions answered.  Breast self exam technique reviewed and patient encouraged to perform self-exam monthly.  Discussed healthy lifestyle modifications.  Recommended 30 minutes of aerobic exercise five times per week.  Discussed calcium needs to prevent osteoporosis.

## 2025-02-11 LAB — TSH SERPL DL<=0.005 MIU/L-ACNC: 1.21 UIU/ML (ref 0.27–4.2)

## 2025-02-13 LAB
CYTOLOGIST CVX/VAG CYTO: NORMAL
CYTOLOGY CVX/VAG DOC CYTO: NORMAL
CYTOLOGY CVX/VAG DOC THIN PREP: NORMAL
DX ICD CODE: NORMAL
HPV I/H RISK 4 DNA CVX QL PROBE+SIG AMP: NEGATIVE
OTHER STN SPEC: NORMAL
SERVICE CMNT-IMP: NORMAL
STAT OF ADQ CVX/VAG CYTO-IMP: NORMAL

## 2025-04-11 ENCOUNTER — OFFICE VISIT (OUTPATIENT)
Dept: INTERNAL MEDICINE | Facility: CLINIC | Age: 50
End: 2025-04-11
Payer: COMMERCIAL

## 2025-04-11 VITALS
HEIGHT: 63 IN | DIASTOLIC BLOOD PRESSURE: 70 MMHG | OXYGEN SATURATION: 96 % | WEIGHT: 118.4 LBS | HEART RATE: 94 BPM | BODY MASS INDEX: 20.98 KG/M2 | SYSTOLIC BLOOD PRESSURE: 114 MMHG

## 2025-04-11 DIAGNOSIS — R50.9 FEVER, UNSPECIFIED FEVER CAUSE: ICD-10-CM

## 2025-04-11 DIAGNOSIS — G43.809 OTHER MIGRAINE WITHOUT STATUS MIGRAINOSUS, NOT INTRACTABLE: ICD-10-CM

## 2025-04-11 DIAGNOSIS — N39.0 ACUTE UTI: ICD-10-CM

## 2025-04-11 DIAGNOSIS — L50.9 HIVES: Primary | ICD-10-CM

## 2025-04-11 PROCEDURE — 99214 OFFICE O/P EST MOD 30 MIN: CPT | Performed by: STUDENT IN AN ORGANIZED HEALTH CARE EDUCATION/TRAINING PROGRAM

## 2025-04-11 NOTE — PROGRESS NOTES
Tera Welsh M.D.  Internal Medicine  [facility]  [address], [address]  [place] [address]  [phone]      Chief Complaint  Urticaria (Hives possible from taking Macrobid. Hives on feet and  up legs also around belly button. Hives do itch. //)    ENMA Hogue is a 49 y.o. female who presents to the office today as an established patient of Dr. Mathews here today for an acute care visit.     History of Present Illness  The patient came in to get checked for hives, a headache, and a urinary tract infection (UTI). She mentioned that she gets UTIs every year, and this latest one started early Monday morning around 2:30 AM. She felt the usual pressure and burning and had to go to the bathroom frequently. She tried drinking cranberry juice to help, but by 8:30 AM, her urine was bloody, so she went to urgent care. They took a urine sample and prescribed Macrobid because she's allergic to penicillin. By Monday night, she had a fever, which went up to 102.6 degrees on Tuesday, along with a severe migraine. She tested negative for COVID-19 and the flu. The fever broke by Wednesday afternoon, but the headache continued. She has been taking Excedrin Migraine. She currently has no UTI symptoms, no abdominal pain, throat swelling, chest pain, or lightheadedness. She hasn't used any new lotions, soaps, detergents, or been bitten by bugs.    On Thursday night, she noticed hives on her feet that spread up her legs and thinks the Macrobid might be causing them. The hives are itchy but not too bad, and they are worse on her right foot, especially around the big toe. She has been managing the itchiness with Benadryl and isn't feeling too uncomfortable. She has had allergic reactions before, like widespread hives from Bactrim and head-to-toe hives from penicillin when she was a kid.    She still has a headache, but it's not as bad as the migraine she had earlier. It gets worse when she bends  over but doesn't stop her from doing daily activities. She hasn't taken any medication for the headache today.      Review of Systems   Constitutional:  Negative for chills, diaphoresis, fatigue and fever.   HENT:  Negative for congestion and sore throat.    Respiratory:  Negative for cough.    Cardiovascular:  Negative for chest pain.   Gastrointestinal:  Negative for abdominal pain, anorexia, nausea and vomiting.   Genitourinary:  Negative for dysuria.   Musculoskeletal:  Negative for joint pain, myalgias and neck pain.   Skin:  Positive for rash.   Neurological:  Positive for headaches. Negative for vertigo, weakness and numbness.     Allergies   Allergen Reactions    Bactrim [Sulfamethoxazole-Trimethoprim] Hives    Macrobid [Nitrofurantoin] Hives    Penicillins Hives        Outpatient Medications Marked as Taking for the 4/11/25 encounter (Office Visit) with Tera Welsh MD   Medication Sig Dispense Refill    alosetron (LOTRONEX) 0.5 MG tablet Take 1 tablet by mouth 2 (Two) Times a Day. 180 tablet 3    multivitamin with minerals tablet tablet Take 1 tablet by mouth Daily.      nortriptyline (PAMELOR) 10 MG capsule TAKE 1 CAPSULE BY MOUTH EVERY DAY AT NIGHT 90 capsule 3    SUMAtriptan (Imitrex) 25 MG tablet Take one tablet at onset of headache. May repeat dose one time in 2 hours if headache not relieved. 9 tablet 11        Past Medical History:   Diagnosis Date    Benign essential tremor     Breast cancer     Cancer     breast cancer 2007 DCIS    LEFT    Clotting disorder 2017    Irregular & very heavy flow during menstral cycles. For 36 hours will have to use super plus tampons & change every 1.5 - 2 hours    Gestational diabetes     Heavy periods     Hx of radiation therapy     IBS (irritable bowel syndrome)     with diarrhea    Iron deficiency     IUD (intrauterine device) in place     Low blood pressure     Menorrhagia with irregular cycle 10/18/2022    Added automatically from request for surgery [id number]     Migraine 2007    On & off throughout the years. Typically once a quarter    Ovarian cyst 1997    Had cyst rupture while in undergrad, went on birth control pills at that time    Pulmonary nodules     Trigger finger of right thumb     s/p steroid injection with hand surgery    Urinary tract infection Feb 2021    First UTI last year    Varicella 1986    Had while in 5th grade     Past Surgical History:   Procedure Laterality Date    BREAST BIOPSY  March 2007    Had DCIS in left breast with surgery to remove the mass. Followed by radiation & 3.5 years of Tamoxifen. Was tested for BRCA 1 & 2 in 2005 with a recent updated tested with Dr. Calderón    BREAST LUMPECTOMY Left 2007    CARDIAC CATHETERIZATION  2001    Table top test. Had issues with fainting. Low blood pressure & was on medication until pregnancy. Haven’t had issues since.    COLONOSCOPY  approx 2011    negative per pt     COLONOSCOPY N/A 03/21/2022    Procedure: COLONOSCOPY to cecum and TI with biopsies;  Surgeon: Tuan Blanco MD;  Location: [facility] ENDOSCOPY;  Service: Gastroenterology;  Laterality: N/A;  pre-change in bowel habits  post-hemorrhoids    D & C HYSTEROSCOPY ENDOMETRIAL ABLATION N/A 11/30/2022    Procedure: DILATATION AND CURETTAGE HYSTEROSCOPY NOVASURE ENDOMETRIAL ABLATION;  Surgeon: Real Posada MD;  Location: [facility] OR;  Service: Obstetrics/Gynecology;  Laterality: N/A;    DIAGNOSTIC LAPAROSCOPY Bilateral 11/30/2022    Procedure: SALPINGECTOMY LAPAROSCOPIC, INTRAUTERINE DEVICE REMOVAL;  Surgeon: Real Posada MD;  Location: [facility] OR;  Service: Obstetrics/Gynecology;  Laterality: Bilateral;    TUBAL ABDOMINAL LIGATION  11.30.22    TUMOR REMOVAL      right foot during childhood    US GUIDED CYST ASPIRATION BREAST N/A 5/20/2024    WISDOM TOOTH EXTRACTION  1996    During undergrad     Family History   Adopted: Yes   Problem Relation Age of Onset    Malig Hyperthermia Neg Hx     reports that she quit smoking about 28 years ago. Her  "smoking use included cigarettes. She started smoking about 31 years ago. She has never used smokeless tobacco. She reports current alcohol use of about 7.0 standard drinks of alcohol per week. She reports that she does not use drugs.    OBJECTIVE    Vital Signs:   /70   Pulse 94   Ht 160 cm (62.99\")   Wt 53.7 kg (118 lb 6.4 oz)   SpO2 96%   BMI 20.98 kg/m²     Physical Exam  Constitutional:       General: She is not in acute distress.     Appearance: Normal appearance.   Pulmonary:      Effort: Pulmonary effort is normal. No respiratory distress.   Skin:     Comments: Erythematous rash on bilateral feet and under umbilicus   Neurological:      Mental Status: She is alert. Mental status is at baseline.   Psychiatric:         Mood and Affect: Mood normal.         Behavior: Behavior normal.         Thought Content: Thought content normal.          Physical Exam      The following data was reviewed by: Tera Welsh MD on 04/11/2025:        TSH          2/10/2025    12:12   TSH   TSH 1.210        Data reviewed : UC note              ASSESSMENT & PLAN        Hives  - Likely a reaction to Macrobid, possibly due to infection  - History of similar reactions to Bactrim and PENICILLIN  - Advised to discontinue Macrobid since UTI symptoms have resolved  - Continue Benadryl or use topical steroid if needed  - Over-the-counter hydrocortisone or calamine lotion for itching  - Avoid hot showers  - go to urgent care if symptoms rapidly progress over the weekend   - no  chest tightness, difficulty breathing, hoarse voice of throat tightness, nausea, vomiting, crampy abdominal pain, or lightheadedness   - counseled hives are usually self-limited         Acute UTI  Seen in urgent care on April 7 for UTI and was prescribed Macrobid.  She has 2 doses left of her antibiotic. Culture grew e coli sensitive to Macrobid.   - UTI symptoms resolved  - Bacteria in urine sensitive to Macrobid  - Safe to discontinue antibiotic since " symptoms have improved and she as almost finished course       Other migraine without status migrainosus, not intractable  -Better with excedrin  - If no improvement, schedule follow-up to consider switching migraine medication or investigate other causes                Fever, unspecified fever cause           Assessment & Plan        There are no preventive care reminders to display for this patient.     Follow Up  No follow-ups on file.    Patient/family had no further questions at this time and verbalized understanding of the plan discussed today.     Patient or patient representative verbalized consent for the use of Ambient Listening during the visit with  Tera Welsh MD for chart documentation. 4/11/2025  10:40 EDT

## 2025-04-15 ENCOUNTER — HOSPITAL ENCOUNTER (OUTPATIENT)
Facility: HOSPITAL | Age: 50
Discharge: HOME | End: 2025-04-15
Admitting: OBSTETRICS & GYNECOLOGY
Payer: COMMERCIAL

## 2025-04-15 DIAGNOSIS — Z12.31 SCREENING MAMMOGRAM FOR BREAST CANCER: ICD-10-CM

## 2025-04-15 PROCEDURE — 77067 SCR MAMMO BI INCL CAD: CPT

## 2025-04-15 PROCEDURE — 77063 BREAST TOMOSYNTHESIS BI: CPT

## 2025-05-19 RX ORDER — NORTRIPTYLINE HYDROCHLORIDE 10 MG/1
10 CAPSULE ORAL NIGHTLY
Qty: 90 CAPSULE | Refills: 3 | Status: SHIPPED | OUTPATIENT
Start: 2025-05-19

## 2025-06-02 RX ORDER — ALOSETRON HYDROCHLORIDE 0.5 MG/1
0.5 TABLET ORAL EVERY 12 HOURS SCHEDULED
Qty: 180 TABLET | Refills: 3 | Status: SHIPPED | OUTPATIENT
Start: 2025-06-02

## 2025-06-13 ENCOUNTER — TELEPHONE (OUTPATIENT)
Dept: NEUROLOGY | Facility: CLINIC | Age: 50
End: 2025-06-13
Payer: COMMERCIAL

## 2025-06-13 NOTE — TELEPHONE ENCOUNTER
Tried to contact Pt to r/s appt on 7/15. Pt phone # was inactive.     SENT [employer] message  notifying  Pt appt has been r/s to 7/23 @8:30    SENT Letter

## 2025-06-23 ENCOUNTER — HOSPITAL ENCOUNTER (OUTPATIENT)
Dept: CT IMAGING | Facility: HOSPITAL | Age: 50
Discharge: HOME | End: 2025-06-23
Admitting: STUDENT IN AN ORGANIZED HEALTH CARE EDUCATION/TRAINING PROGRAM
Payer: COMMERCIAL

## 2025-06-23 DIAGNOSIS — R91.8 PULMONARY NODULES: ICD-10-CM

## 2025-06-23 PROCEDURE — 71250 CT THORAX DX C-: CPT

## 2025-07-22 ENCOUNTER — OFFICE VISIT (OUTPATIENT)
Dept: GASTROENTEROLOGY | Facility: CLINIC | Age: 50
End: 2025-07-22
Payer: COMMERCIAL

## 2025-07-22 VITALS
BODY MASS INDEX: 21.09 KG/M2 | HEART RATE: 71 BPM | TEMPERATURE: 97.5 F | SYSTOLIC BLOOD PRESSURE: 111 MMHG | HEIGHT: 63 IN | WEIGHT: 119 LBS | DIASTOLIC BLOOD PRESSURE: 75 MMHG

## 2025-07-22 DIAGNOSIS — K58.0 IRRITABLE BOWEL SYNDROME WITH DIARRHEA: Primary | ICD-10-CM

## 2025-07-22 PROCEDURE — 99213 OFFICE O/P EST LOW 20 MIN: CPT | Performed by: NURSE PRACTITIONER

## 2025-07-22 NOTE — PROGRESS NOTES
Chief Complaint   Patient presents with    Irritable Bowel Syndrome       HPI    Patti Hogue is a  49 y.o. female here for a follow up visit for irritable bowel syndrome.    This is a former patient of Dr. Blanco's that is known to me.    Past medical and surgical history reviewed as below.    Today she reports adequate control of IBS on a combination of Lotronex and nightly Pamelor.  She gets breakthrough diarrhea about once a month otherwise she is passing formed stool daily without abdominal pain, rectal pain or rectal bleeding.  Her appetite has been good.  Her weight has been stable.    Additional data reviewed:     C-scope 2022 - Normal ileum. NBIH.  Recall 10 years.    Past Medical History:   Diagnosis Date    Benign essential tremor     Breast cancer     DCIS in left breast, removed mass, followed by radiation & 3.5 years of Tamoxifen    Cancer 03.07    breast cancer 2007 DCIS    LEFT    Clotting disorder 2017    Irregular & very heavy flow during menstral cycles. For 36 hours will have to use super plus tampons & change every 1.5 - 2 hours    Gestational diabetes     Had with both pregnancies. Controlled with diet for first with testing & 2nd tried anvariety of medications    Heavy periods     Hx of radiation therapy     IBS (irritable bowel syndrome)     with diarrhea    Iron deficiency     IUD (intrauterine device) in place     Low blood pressure     Menorrhagia with irregular cycle 10/18/2022    Added automatically from request for surgery [id number]    Migraine 2007    On & off throughout the years. Typically once a quarter    Ovarian cyst 1997    Had cyst rupture while in undergrad, went on birth control pills at that time    Pulmonary nodules     Trigger finger of right thumb     s/p steroid injection with hand surgery    Urinary tract infection May 2024, Feb 2021    First UTI last year    Varicella 1986    Had while in 5th grade       Past Surgical History:   Procedure Laterality Date    BREAST BIOPSY   2007    Had DCIS in left breast with surgery to remove the mass. Followed by radiation & 3.5 years of Tamoxifen. Was tested for BRCA 1 & 2 in  with a recent updated tested with Dr. Calderón    BREAST LUMPECTOMY Left     CARDIAC CATHETERIZATION      Table top test. Had issues with fainting. Low blood pressure & was on medication until pregnancy. Haven’t had issues since.    COLONOSCOPY  approx     negative per pt     COLONOSCOPY N/A 2022    Procedure: COLONOSCOPY to cecum and TI with biopsies;  Surgeon: Tuan Blanco MD;  Location: [facility] ENDOSCOPY;  Service: Gastroenterology;  Laterality: N/A;  pre-change in bowel habits  post-hemorrhoids    D & C HYSTEROSCOPY ENDOMETRIAL ABLATION N/A 2022    Procedure: DILATATION AND CURETTAGE HYSTEROSCOPY NOVASURE ENDOMETRIAL ABLATION;  Surgeon: Real Posada MD;  Location: [facility] OR;  Service: Obstetrics/Gynecology;  Laterality: N/A;    DIAGNOSTIC LAPAROSCOPY Bilateral 2022    Procedure: SALPINGECTOMY LAPAROSCOPIC, INTRAUTERINE DEVICE REMOVAL;  Surgeon: Real Posada MD;  Location: [facility] OR;  Service: Obstetrics/Gynecology;  Laterality: Bilateral;    TUBAL ABDOMINAL LIGATION  22    TUMOR REMOVAL      right foot during childhood    US GUIDED CYST ASPIRATION BREAST N/A 2024    WISDOM TOOTH EXTRACTION      During undergrad       Scheduled Meds:     Continuous Infusions: No current facility-administered medications for this visit.      PRN Meds:     Allergies   Allergen Reactions    Bactrim [Sulfamethoxazole-Trimethoprim] Hives    Macrobid [Nitrofurantoin] Hives    Penicillins Hives       Social History     Socioeconomic History    Marital status:      Spouse name: Max    Number of children: 2    Years of education: College   Tobacco Use    Smoking status: Former     Current packs/day: 0.00     Types: Cigarettes     Start date:      Quit date:      Years since quittin.5    Smokeless tobacco:  Never   Vaping Use    Vaping status: Never Used   Substance and Sexual Activity    Alcohol use: Yes     Alcohol/week: 7.0 standard drinks of alcohol     Types: 7 Glasses of wine per week     Comment: On average, glass of wine a day    Drug use: Never    Sexual activity: Yes     Partners: Male     Birth control/protection: Tubal ligation       Family History   Adopted: Yes   Problem Relation Age of Onset    Malig Hyperthermia Neg Hx      Vitals:    07/22/25 0833   BP: 111/75   Pulse: 71   Temp: 97.5 °F (36.4 °C)       Physical Exam  Constitutional:       Appearance: She is well-developed.   Abdominal:      General: Bowel sounds are normal. There is no distension.      Palpations: Abdomen is soft. There is no mass.      Tenderness: There is no abdominal tenderness. There is no guarding.      Hernia: No hernia is present.   Skin:     General: Skin is warm and dry.      Capillary Refill: Capillary refill takes less than 2 seconds.   Neurological:      Mental Status: She is alert and oriented to person, place, and time.   Psychiatric:         Behavior: Behavior normal.     Assessment    Diagnoses and all orders for this visit:    1. Irritable bowel syndrome with diarrhea (Primary)    Plan    Stable IBS-D continue nortriptyline and Lotronex  No alarm symptoms on visit today to warrant endoscopic evaluation  Follow-up 1 year or sooner if needed         ANGÉLICA Johnson  [facility]  [address] [place] [address]  Office: [phone]

## 2025-07-23 ENCOUNTER — OFFICE VISIT (OUTPATIENT)
Dept: NEUROLOGY | Facility: CLINIC | Age: 50
End: 2025-07-23
Payer: COMMERCIAL

## 2025-07-23 VITALS
DIASTOLIC BLOOD PRESSURE: 82 MMHG | HEART RATE: 68 BPM | WEIGHT: 117 LBS | SYSTOLIC BLOOD PRESSURE: 104 MMHG | OXYGEN SATURATION: 94 % | BODY MASS INDEX: 20.73 KG/M2

## 2025-07-23 DIAGNOSIS — G25.2 POSTURAL TREMOR: ICD-10-CM

## 2025-07-23 DIAGNOSIS — G25.0 BENIGN ESSENTIAL TREMOR: Primary | ICD-10-CM

## 2025-07-23 RX ORDER — ANTIARTHRITIC COMBINATION NO.2 900 MG
1 TABLET ORAL DAILY
COMMUNITY

## 2025-07-23 NOTE — PROGRESS NOTES
DOS: 2025  NAME: Patti Hogue   : 1975  PCP: Chente Mathews DO    Chief Complaint   Patient presents with    Benign essential tremor      SUBJECTIVE  Neurological Problem:  49 y.o. right-handed female with a past medical history of IBS, h/o breast ca in situ, migraine, benign essential tremor. They are seen in follow up today for essential tremor. A summary of the history was taken from the previous note with additions/modifications as indicated. She is unaccompanied.    Interval History:   **For detailed interval history see progress note dated 24.    Mrs. Hogue follows in our clinic for essential tremor. She is a patient of Dr. Reyna. She is adopted and unaware of her genetic family history. She has thus far decided to forego medical management. She was last seen by me in 2024.    CURRENT History:    She presents today in follow up and reports tremor is no worse and has not spread to other extremities or her head/neck/jaw. It is in her hands. She notices it more when she is stressed. She sees it most at dinner when holding her utensils or her phone - that time of day is when she is more present and therefore more cognizant of her movements. The tremor still presents predominantly in her left hand but she notices it some in her right. She denies any weakness to her hands or  change. She denies any rigidity or slowness of her movements, no freezing/stuck episodes, no drooling/excessive salivating.   Recent lab review: TSH 1.210     Review of Systems   Neurological:  Positive for tremors.        The following portions of the patient's history were reviewed and updated as appropriate: allergies, current medications, past family history, past medical history, past social history, past surgical history and problem list.    Current Medications:   Current Outpatient Medications:     alosetron (LOTRONEX) 0.5 MG tablet, TAKE 1 TABLET BY MOUTH TWICE A DAY, Disp: 180 tablet, Rfl: 3    Biotin 5000 MCG  tablet, Take 1 tablet by mouth Daily., Disp: , Rfl:     multivitamin with minerals tablet tablet, Take 1 tablet by mouth Daily., Disp: , Rfl:     nortriptyline (PAMELOR) 10 MG capsule, TAKE 1 CAPSULE BY MOUTH EVERY NIGHT, Disp: 90 capsule, Rfl: 3    SUMAtriptan (Imitrex) 25 MG tablet, Take one tablet at onset of headache. May repeat dose one time in 2 hours if headache not relieved., Disp: 9 tablet, Rfl: 11  **I did not stop or change the above medications.  Patient's medication list was updated to reflect medications they have reported as currently taking, including medication changes made by other providers.    Objective   Vital Signs:  /82   Pulse 68   Wt 53.1 kg (117 lb)   SpO2 94%   BMI 20.73 kg/m²   Body mass index is 20.73 kg/m².    Physical Exam   Physical Exam:  GENERAL: NAD, alert    Neurological:   MS: AOx3, recent/remote memory intact, normal attention/concentration, language intact, no neglect.  CN: visual acuity grossly normal, PERRL, EOMI, no facial droop, no dysarthria  Motor: Moves all four extremities symmetrically and against gravity. No pronator drift. Normal tone and bulk. No abnormal movements noted. Mild postural tremor of left upper extremity.   Left  2+  Right  2+  Coordination: Normal finger to nose test  Rapid alternating movements: Normal finger to thumb tap  Gait and station: Normal gait and station    Result Review :  The following data was reviewed by: ANGÉLICA Beal on 07/16/2024:  Laboratory Results:         Lab Results   Component Value Date    WBC 4.01 02/05/2024    HGB 13.5 02/05/2024    HCT 40.2 02/05/2024    MCV 96.2 02/05/2024     02/05/2024     Lab Results   Component Value Date    GLUCOSE 107 (H) 02/05/2024    BUN 13 02/05/2024    CREATININE 0.79 02/05/2024    EGFRIFNONA 85 02/24/2022    EGFRIFAFRI 103 02/24/2022    BCR 16.5 02/05/2024    K 4.4 02/05/2024    CO2 27.3 02/05/2024    CALCIUM 9.2 02/05/2024    ALBUMIN 4.4 02/05/2024    AST 16  "02/05/2024    ALT 9 02/05/2024     Lab Results   Component Value Date    HGBA1C 5.10 02/05/2024     No results found for: \"CHOL\"  Lab Results   Component Value Date     (H) 08/07/2023     07/07/2022     Lab Results   Component Value Date    LDL 80 08/07/2023    LDL 75 07/07/2022     Lab Results   Component Value Date    TRIG 63 08/07/2023    TRIG 64 07/07/2022     No results found for: \"RPR\"  Lab Results   Component Value Date    TSH 1.210 02/10/2025     No results found for: \"[id number]\"           Assessment and Plan   Diagnoses and all orders for this visit:    1. Benign essential tremor (Primary)    2. Postural tremor      Patti does not want to pursue medical management of her tremor. Patti will continue to monitor her tremor until next follow-up and let us know if there are any changes beforehand. Handwriting sample in chart collected today, under Media tab.     We will see aPtti back in 12 months, sooner if symptoms warrant.     ANGÉLICA Beal  [facility]   07/23/25       Follow Up   Return in about 1 year (around 7/23/2026).  Patient was given instructions and counseling regarding her condition or for health maintenance advice. Please see specific information pulled into the AVS if appropriate.     I spent 20 minutes caring for the patient on this date of service. This time includes time spent by me in the following activities:preparing for the visit, obtaining and/or reviewing a separately obtained history, performing a medically appropriate examination and/or evaluation , counseling and educating the patient/family/caregiver, documenting information in the medical record, independently interpreting results and communicating that information with the patient/family/caregiver, and care coordination     Dictated using Dragon Dictation    As of April 2021, as required by the Federal 21st Century Cures Act, medical records (including provider notes and laboratory/imaging results) are to " be made available to patient’s and/or their designees as soon as the documents are signed/resulted. While the intention is to ensure transparency and to engage the patient in their healthcare, this immediate access may create unintended consequences as this document uses language intended for communication between medical experts and diagnostic results are interpreted with the entirety of the patient’s clinical picture in mind. It is recommended that patients and/or their designees review all available information with their primary or specialist providers for explanation and guidance to avoid misinterpretation based on layperson understanding, non-medical expert opinions, or Internet searches.

## 2025-08-25 ENCOUNTER — OFFICE VISIT (OUTPATIENT)
Dept: INTERNAL MEDICINE | Facility: CLINIC | Age: 50
End: 2025-08-25
Payer: COMMERCIAL

## 2025-08-25 VITALS
SYSTOLIC BLOOD PRESSURE: 121 MMHG | TEMPERATURE: 97.5 F | OXYGEN SATURATION: 99 % | BODY MASS INDEX: 21.44 KG/M2 | HEART RATE: 83 BPM | HEIGHT: 63 IN | DIASTOLIC BLOOD PRESSURE: 84 MMHG | WEIGHT: 121 LBS

## 2025-08-25 DIAGNOSIS — Z00.00 ANNUAL PHYSICAL EXAM: Primary | ICD-10-CM

## 2025-08-25 DIAGNOSIS — Z13.220 SCREENING FOR HYPERLIPIDEMIA: ICD-10-CM

## 2025-08-25 LAB
ALBUMIN SERPL-MCNC: 4.5 G/DL (ref 3.5–5.2)
ALBUMIN/GLOB SERPL: 2.4 G/DL
ALP SERPL-CCNC: 35 U/L (ref 39–117)
ALT SERPL-CCNC: 11 U/L (ref 1–33)
AST SERPL-CCNC: 16 U/L (ref 1–32)
BASOPHILS # BLD AUTO: 0.02 10*3/MM3 (ref 0–0.2)
BASOPHILS NFR BLD AUTO: 0.4 % (ref 0–1.5)
BILIRUB SERPL-MCNC: 0.5 MG/DL (ref 0–1.2)
BUN SERPL-MCNC: 14 MG/DL (ref 6–20)
BUN/CREAT SERPL: 20 (ref 7–25)
CALCIUM SERPL-MCNC: 9 MG/DL (ref 8.6–10.5)
CHLORIDE SERPL-SCNC: 101 MMOL/L (ref 98–107)
CHOLEST SERPL-MCNC: 174 MG/DL (ref 0–200)
CO2 SERPL-SCNC: 26.4 MMOL/L (ref 22–29)
CREAT SERPL-MCNC: 0.7 MG/DL (ref 0.57–1)
EGFRCR SERPLBLD CKD-EPI 2021: 106.8 ML/MIN/1.73
EOSINOPHIL # BLD AUTO: 0.1 10*3/MM3 (ref 0–0.4)
EOSINOPHIL NFR BLD AUTO: 2.1 % (ref 0.3–6.2)
ERYTHROCYTE [DISTWIDTH] IN BLOOD BY AUTOMATED COUNT: 12.1 % (ref 12.3–15.4)
GLOBULIN SER CALC-MCNC: 1.9 GM/DL
GLUCOSE SERPL-MCNC: 103 MG/DL (ref 65–99)
HCT VFR BLD AUTO: 38.1 % (ref 34–46.6)
HDLC SERPL-MCNC: 90 MG/DL (ref 40–60)
HGB BLD-MCNC: 12.6 G/DL (ref 12–15.9)
IMM GRANULOCYTES # BLD AUTO: 0.01 10*3/MM3 (ref 0–0.05)
IMM GRANULOCYTES NFR BLD AUTO: 0.2 % (ref 0–0.5)
LDLC SERPL CALC-MCNC: 61 MG/DL (ref 0–100)
LYMPHOCYTES # BLD AUTO: 1.3 10*3/MM3 (ref 0.7–3.1)
LYMPHOCYTES NFR BLD AUTO: 26.7 % (ref 19.6–45.3)
MCH RBC QN AUTO: 32.1 PG (ref 26.6–33)
MCHC RBC AUTO-ENTMCNC: 33.1 G/DL (ref 31.5–35.7)
MCV RBC AUTO: 96.9 FL (ref 79–97)
MONOCYTES # BLD AUTO: 0.26 10*3/MM3 (ref 0.1–0.9)
MONOCYTES NFR BLD AUTO: 5.3 % (ref 5–12)
NEUTROPHILS # BLD AUTO: 3.17 10*3/MM3 (ref 1.7–7)
NEUTROPHILS NFR BLD AUTO: 65.3 % (ref 42.7–76)
NRBC BLD AUTO-RTO: 0 /100 WBC (ref 0–0.2)
PLATELET # BLD AUTO: 203 10*3/MM3 (ref 140–450)
POTASSIUM SERPL-SCNC: 4.5 MMOL/L (ref 3.5–5.2)
PROT SERPL-MCNC: 6.4 G/DL (ref 6–8.5)
RBC # BLD AUTO: 3.93 10*6/MM3 (ref 3.77–5.28)
SODIUM SERPL-SCNC: 138 MMOL/L (ref 136–145)
TRIGL SERPL-MCNC: 139 MG/DL (ref 0–150)
VLDLC SERPL CALC-MCNC: 23 MG/DL (ref 5–40)
WBC # BLD AUTO: 4.86 10*3/MM3 (ref 3.4–10.8)

## 2025-08-25 PROCEDURE — 99396 PREV VISIT EST AGE 40-64: CPT | Performed by: STUDENT IN AN ORGANIZED HEALTH CARE EDUCATION/TRAINING PROGRAM

## (undated) DEVICE — ST IRR CYSTO W/SPK 77IN LF

## (undated) DEVICE — MANIP UTER KRONNER

## (undated) DEVICE — SOL NACL 0.9PCT 1000ML

## (undated) DEVICE — ENDOPATH XCEL BLADELESS TROCARS WITH STABILITY SLEEVES: Brand: ENDOPATH XCEL

## (undated) DEVICE — APPL CHLORAPREP HI/LITE 26ML ORNG

## (undated) DEVICE — KT ORCA ORCAPOD DISP STRL

## (undated) DEVICE — TUBING, SUCTION, 1/4" X 10', STRAIGHT: Brand: MEDLINE

## (undated) DEVICE — ADHS SKIN SURG TISS VISC PREMIERPRO EXOFIN HI/VISC FAST/DRY

## (undated) DEVICE — LN SMPL CO2 SHTRM SD STREAM W/M LUER

## (undated) DEVICE — DRSNG TELFA PAD NONADH STR 1S 3X4IN

## (undated) DEVICE — TUBING, SUCTION, 1/4" X 20', STRAIGHT: Brand: MEDLINE INDUSTRIES, INC.

## (undated) DEVICE — SUT VIC 0 TN 27IN DYED JTN0G

## (undated) DEVICE — LAPAROVUE VISIBILITY SYSTEM LAPAROSCOPIC SOLUTIONS: Brand: LAPAROVUE

## (undated) DEVICE — ENDOPATH XCEL UNIVERSAL TROCAR STABLILITY SLEEVES: Brand: ENDOPATH XCEL

## (undated) DEVICE — ADAPT CLN BIOGUARD AIR/H2O DISP

## (undated) DEVICE — GLV SURG SIGNATURE ESSENTIAL PF LTX SZ6

## (undated) DEVICE — 2, DISPOSABLE SUCTION/IRRIGATOR WITH DISPOSABLE TIP: Brand: STRYKEFLOW

## (undated) DEVICE — CANN O2 ETCO2 FITS ALL CONN CO2 SMPL A/ 7IN DISP LF

## (undated) DEVICE — 1000ML,PRESSURE INFUSER W/STOPCOCK: Brand: MEDLINE

## (undated) DEVICE — LOU GYN LAPAROSCOPY: Brand: MEDLINE INDUSTRIES, INC.

## (undated) DEVICE — SENSR O2 OXIMAX FNGR A/ 18IN NONSTR

## (undated) DEVICE — SYR CONTRL PRESS/LO FIX/M/LL W/THMB/RNG 10ML

## (undated) DEVICE — HARMONIC ACE +7 LAPAROSCOPIC SHEARS ADVANCED HEMOSTASIS 5MM DIAMETER 36CM SHAFT LENGTH  FOR USE WITH GRAY HAND PIECE ONLY: Brand: HARMONIC ACE

## (undated) DEVICE — ANTIBACTERIAL UNDYED BRAIDED (POLYGLACTIN 910), SYNTHETIC ABSORBABLE SUTURE: Brand: COATED VICRYL

## (undated) DEVICE — LAPAROSCOPIC SMOKE FILTRATION SYSTEM: Brand: PALL LAPAROSHIELD® PLUS LAPAROSCOPIC SMOKE FILTRATION SYSTEM

## (undated) DEVICE — ENDOPATH PNEUMONEEDLE INSUFFLATION NEEDLES WITH LUER LOCK CONNECTORS 120MM: Brand: ENDOPATH

## (undated) DEVICE — PROB ABL ENDOMTRL NOVASURE/G4 W/SURESND

## (undated) DEVICE — STRAP STIRUP WO/ RNG